# Patient Record
Sex: FEMALE | Race: WHITE | NOT HISPANIC OR LATINO | Employment: PART TIME | URBAN - METROPOLITAN AREA
[De-identification: names, ages, dates, MRNs, and addresses within clinical notes are randomized per-mention and may not be internally consistent; named-entity substitution may affect disease eponyms.]

---

## 2017-03-03 ENCOUNTER — HOSPITAL ENCOUNTER (EMERGENCY)
Facility: HOSPITAL | Age: 37
Discharge: HOME | End: 2017-03-03
Attending: EMERGENCY MEDICINE
Payer: MEDICAID

## 2017-03-03 VITALS
RESPIRATION RATE: 18 BRPM | OXYGEN SATURATION: 97 % | WEIGHT: 130 LBS | BODY MASS INDEX: 20.98 KG/M2 | SYSTOLIC BLOOD PRESSURE: 114 MMHG | DIASTOLIC BLOOD PRESSURE: 70 MMHG | TEMPERATURE: 98 F | HEART RATE: 87 BPM

## 2017-03-03 DIAGNOSIS — S99.921A RIGHT FOOT INJURY: ICD-10-CM

## 2017-03-03 DIAGNOSIS — S90.811A ABRASION, RIGHT FOOT, INITIAL ENCOUNTER: Primary | ICD-10-CM

## 2017-03-03 DIAGNOSIS — F41.9 ANXIETY DISORDER, UNSPECIFIED TYPE: ICD-10-CM

## 2017-03-03 DIAGNOSIS — S99.921A FOOT INJURY, RIGHT, INITIAL ENCOUNTER: ICD-10-CM

## 2017-03-03 PROCEDURE — 99283 EMERGENCY DEPT VISIT LOW MDM: CPT

## 2017-03-03 PROCEDURE — 25000003 PHARM REV CODE 250: Performed by: EMERGENCY MEDICINE

## 2017-03-03 RX ORDER — NAPROXEN 500 MG/1
500 TABLET ORAL 2 TIMES DAILY WITH MEALS
Qty: 10 TABLET | Refills: 0 | Status: SHIPPED | OUTPATIENT
Start: 2017-03-03 | End: 2017-04-05 | Stop reason: CLARIF

## 2017-03-03 RX ORDER — HYDROXYZINE PAMOATE 25 MG/1
25 CAPSULE ORAL EVERY 6 HOURS PRN
Qty: 20 CAPSULE | Refills: 0 | Status: SHIPPED | OUTPATIENT
Start: 2017-03-03 | End: 2018-09-12 | Stop reason: CLARIF

## 2017-03-03 RX ORDER — HYDROXYZINE PAMOATE 25 MG/1
25 CAPSULE ORAL
Status: COMPLETED | OUTPATIENT
Start: 2017-03-03 | End: 2017-03-03

## 2017-03-03 RX ORDER — NAPROXEN 500 MG/1
500 TABLET ORAL
Status: COMPLETED | OUTPATIENT
Start: 2017-03-03 | End: 2017-03-03

## 2017-03-03 RX ADMIN — NAPROXEN 500 MG: 500 TABLET ORAL at 06:03

## 2017-03-03 RX ADMIN — HYDROXYZINE PAMOATE 25 MG: 25 CAPSULE ORAL at 06:03

## 2017-03-03 NOTE — ED AVS SNAPSHOT
[facility]  [address] [facility] [id number]               Manuela Dia   3/3/2017  6:01 PM   ED    Description:  Female : 1980   Department:  [facility]            Your Care was Coordinated By:     Provider Role From To    Ted Law Jr., MD Attending Provider 17 0811 --      Reason for Visit     Foot Injury           Diagnoses this Visit        Comments    Abrasion, right foot, initial encounter    -  Primary     Foot injury, right, initial encounter         Right foot injury         Anxiety disorder, unspecified type           ED Disposition     None           To Do List           Follow-up Information     Follow up with [facility]. Call in 3 days.    Why:  As needed  to schedule appt for recheck of your right foot - follo wup with your psychaitrist regarding long term treatment of your anxiety/ mental health issues    Contact information:    [address] [facility] [id number] [phone]         These Medications        Disp Refills Start End    naproxen (NAPROSYN) 500 MG tablet 10 tablet 0 3/3/2017     Take 1 tablet (500 mg total) by mouth 2 (two) times daily with meals. Prn pain - take with food - Oral    hydrOXYzine pamoate (VISTARIL) 25 MG Cap 20 capsule 0 3/3/2017     Take 1 capsule (25 mg total) by mouth every 6 (six) hours as needed (prn anxiety). - Oral      [facility]     [facility] On [facility] Nurse Care Line -  Assistance  Registered nurses in the [facility] provide clinical advisement, health education, appointment booking, and other advisory services.  Call for this free service at [phone].             Medications           Message regarding Medications     Verify the changes and/or additions to your medication regime listed below are the same as discussed with your clinician today.  If any of these changes or additions are incorrect, please notify your healthcare  provider.        START taking these NEW medications        Refills    naproxen (NAPROSYN) 500 MG tablet 0    Sig: Take 1 tablet (500 mg total) by mouth 2 (two) times daily with meals. Prn pain - take with food    Class: Print    Route: Oral    hydrOXYzine pamoate (VISTARIL) 25 MG Cap 0    Sig: Take 1 capsule (25 mg total) by mouth every 6 (six) hours as needed (prn anxiety).    Class: Print    Route: Oral      These medications were administered today        Dose Freq    hydrOXYzine pamoate capsule 25 mg 25 mg ED 1 Time    Sig: Take 1 capsule (25 mg total) by mouth ED 1 Time.    Class: Normal    Route: Oral    naproxen tablet 500 mg 500 mg ED 1 Time    Sig: Take 1 tablet (500 mg total) by mouth ED 1 Time.    Class: Normal    Route: Oral           Verify that the below list of medications is an accurate representation of the medications you are currently taking.  If none reported, the list may be blank. If incorrect, please contact your healthcare provider. Carry this list with you in case of emergency.           Current Medications     albuterol 90 mcg/actuation inhaler Inhale 1-2 puffs into the lungs every 6 (six) hours as needed for Wheezing.    clonazePAM (KLONOPIN) 2 MG Tab Take 2 mg by mouth 2 (two) times daily.    hydrOXYzine pamoate (VISTARIL) 25 MG Cap Take 1 capsule (25 mg total) by mouth every 6 (six) hours as needed (prn anxiety).    naproxen (NAPROSYN) 500 MG tablet Take 1 tablet (500 mg total) by mouth 2 (two) times daily with meals. Prn pain - take with food    ondansetron (ZOFRAN-ODT) 4 MG TbDL Take 1 tablet (4 mg total) by mouth every 8 (eight) hours as needed.           Clinical Reference Information           Your Vitals Were     BP Pulse Temp Resp Weight Last Period    114/70 (BP Location: Right arm, Patient Position: Sitting) 87 97.9 °F (36.6 °C) (Oral) 18 59 kg (130 lb) 07/08/2014    SpO2 BMI             97% 20.98 kg/m2         Allergies as of 3/3/2017     No Known Allergies      Immunizations  Administered on Date of Encounter - 3/3/2017     None      ED Micro, Lab, POCT     None      ED Imaging Orders     Start Ordered       Status Ordering Provider    03/03/17 1814 03/03/17 1813  X-Ray Foot Complete Right  1 time imaging      Final result     03/03/17 1812 03/03/17 1811    1 time imaging,   Status:  Canceled      Canceled         Discharge Instructions         Abrasions  Abrasions are skin scrapes. Their treatment depends on how large and deep the abrasion is.  Home care  You may be prescribed an antibiotic cream or ointment to apply to the wound. This helps prevent infection. Follow instructions when using this medication.  General care  · To care for the abrasion, do the following each day for as long as directed by your health care provider.  ¨ If you were given a bandage, change it once a day. If your bandage sticks to the wound, soak it in warm water until it loosens.  ¨ Wash the area with soap and warm water. You may do this in a sink or under a tub faucet or shower. Rinse off the soap. Then pat the area dry with a clean towel.  ¨ If antibiotic ointment or cream was prescribed, reapply it to the wound as directed. Cover the wound with a fresh non-stick bandage. If the bandage becomes wet or dirty, change it as soon as possible.  · You may use acetaminophen or ibuprofen to control pain unless another pain medication was prescribed. Note: If you have chronic liver or kidney disease or ever had a stomach ulcer or GI bleeding, talk with your health care provider before using these medications. Do not use ibuprofen in children under six months of age.  · Most skin wounds heal within ten days. But an infection may occur despite treatment. Therefore, monitor the wound for signs of infection as listed below.  Follow-up care  Follow up with your health care provider, or as advised.  When to seek medical advice  Call your health care provider right away if any of these occur:  · Fever of 101ºF (38.3ºC) or  higher, or as directed by your health care provider  · Increasing pain, redness, swelling, or drainage from the wound  · Bleeding from the wound that does not stop after a few minutes of steady, firm pressure  · Decreased ability to move any body part near wound  Date Last Reviewed: 3/22/2015  © 9226-6794 [employer]. [address] [place] [address], [place] [address]. All rights reserved. This information is not intended as a substitute for professional medical care. Always follow your healthcare professional's instructions.          ACE Wrap  Minor muscle or joint injuries are often treated with an elastic bandage. The bandage provides support and compression to the injured area. An elastic bandage is a stretchy, rolled bandage. Elastic bandages range in width from 2 to 6 inches. They can be used for a variety of injuries. The bandages are often called ACE bandages, after the most common brand name.  If used correctly, elastic bandages help control swelling and ease pain. An elastic bandage is also a good reminder not to overuse the injured area. However, elastic bandages do not provide a lot of support and will not prevent reinjury.  Home care    To apply an elastic bandage:  · Check the skin before wrapping the injury. It should be clean, dry, and free of drainage.  · Start wrapping below the injury and work your way toward the body. For an ankle sprain, start wrapping around the foot and work up toward the calf. This will help control swelling.  · Overlap the edges of the bandage so it stays snuggly in place.  · Wrap the bandage firmly, but not too tightly. A tight bandage can increase swelling on either end of the bandage. Make sure the bandage is wrinkle free.  · Leave fingers and toes exposed.  · Secure ends of the bandage (even self-sticking ones) with clips or tape.  · Check frequently to ensure adequate circulation, especially in the fingers and toes. Loosen the bandage if there is local swelling,  numbness, tingling, discomfort, coldness, or discoloration (skin pale or bluish in color).  · Rewrap the bandage as needed during the day. Reroll the bandage as you unwind it.  Continue using the elastic bandage until the pain and swelling are gone or as your healthcare provider advises.  If you have been told to ice the area, the ice can be secured in place with the elastic bandage. Wrap the ice pack with a thin towel to protect the skin. Do not put ice or an ice pack directly on the skin.  Ice the area for no more than 20 minutes at a time.    Follow-up care  Follow up with your healthcare provider, as advised.  When to seek medical advice  Call your healthcare provider for any of the following:  · Pain and swelling that doesn't get better or gets worse  · Trouble moving injured area  · Skin discoloration, numbness, or tingling that doesnt go away after bandage is removed  Date Last Reviewed: 9/13/2015 © 2000-2016 [employer]. [address] [place] [address]. All rights reserved. This information is not intended as a substitute for professional medical care. Always follow your healthcare professional's instructions.          Treating Anxiety Disorders with Medication  An anxiety disorder can make you feel nervous or apprehensive, even without a clear reason. Certain anxiety disorders can cause intense feelings of fear or panic. You may even have physical symptoms, such as a racing heartbeat or dizziness. If you have these feelings, you dont have to suffer anymore. Treatment to help you overcome your fears will likely include therapy (also called counseling). Medication may also be prescribed to help control your symptoms.    Medications  Certain medications may be prescribed to help control your symptoms. As a result, you may feel less anxious. You may also feel able to move forward with therapy. At first, medications and dosages may need to be adjusted to find what works best for you.  Try to be patient. Tell your health care provider how a medication makes you feel. This way, you can work together to find the treatment thats best for you. Keep in mind that medications can have side effects. Talk to your provider about any side effects that are bothering you. Changing the dose or type of medication may help. Dont stop taking medication on your own because it can cause symptoms to come back.  · Anti-anxiety medication: This medication eases symptoms and helps you relax. Your health care provider will explain when and how to use it. It may be prescribed for use before situations that makes you anxious. Or, you may be told to take it on a regular schedule. Anti-anxiety medication may make you feel a little sleepy or out of it. Dont drive a car or operate machinery while on this medication, until you know how it affects you.  Caution  Never use alcohol or other drugs with anti-anxiety medications. This could result in loss of muscular control, sedation, coma or death. Also, use only the amount of medication prescribed for you. If you think you may have taken too much, get emergency care right away.   · Antidepressant medication: This kind of medication is often used to treat anxiety, even if you arent depressed. An antidepressant helps balance out brain chemicals. This helps keep anxiety under control. This medication is taken on a schedule. It takes a few weeks to start working. If you dont notice a change at first, you may just need more time. But if you dont notice results after the first few weeks, tell your provider.  Keep taking medications as prescribed  Never change your dosage or stop taking your medications without talking to your health care provider first. Keep the following in mind:  · Some medications must be taken on a schedule. Make this part of your daily routine. For instance, always take your pill before brushing your teeth. A pillbox can help you remember if youve taken  your medication each day.  · Medications are often taken for 6 to 12 months. Your health care provider will then evaluate whether you need to stay on them. Many people who have also had therapy may no longer need medication to manage anxiety.  · You may need to stop taking medication slowly to give your body time to adjust. When its time to stop, your health care provider will tell you more. Remember: Never stop taking your medication without talking to your provider first.  · If symptoms return, you may need to start taking medications again. This isnt your fault. Its just the nature of your anxiety disorder.  Special concerns  · Side effects: Medications may cause side effects. Ask your health care provider or pharmacist what you can expect. They may have ideas for avoiding some side effects.  · Sexual problems: Some antidepressants can affect your desire for sex or your ability to have an orgasm. A change in dosage or medication often solves the problem. If you have a sexual side effect that concerns you, tell your health care provider.  · Addiction: Antidepressants are not addictive. And if youve never had a problem with drugs or alcohol, you likely wont have a problem with anti-anxiety medication. But if you have history of addiction, you may need to avoid this medication.   Date Last Reviewed: 3/27/2015  © 4932-1216 [employer]. [address] [place] [address], [place] [address]. All rights reserved. This information is not intended as a substitute for professional medical care. Always follow your healthcare professional's instructions.          [facility] Sign-Up     Activating your [facility] account is as easy as 1-2-3!     1) Visit [email or web address], select Sign Up Now, enter this activation code and your date of birth, then select Next.  [email or web address]  Expires: 4/17/2017  6:53 PM      2) Create a username and password to use when you visit [facility] in the future and select a security question in  case you lose your password and select Next.    3) Enter your e-mail address and click Sign Up!    Additional Information  If you have questions, please e-mail [email or web address] or call [phone] to talk to our [facility] staff. Remember, [facility] is NOT to be used for urgent needs. For medical emergencies, dial 911.          [facility] complies with applicable Federal civil rights laws and does not discriminate on the basis of race, color, national origin, age, disability, or sex.        Language Assistance Services     ATTENTION: Language assistance services are available, free of charge. Please call [phone].      ATENCIÓN: Si habla español, tiene a shanks disposición servicios gratuitos de asistencia lingüística. Llame al [phone].     CHÚ Ý: N?u b?n nói Ti?ng Vi?t, có các d?ch v? h? tr? ngôn ng? mi?n phí dành cho b?n. G?i s? [phone].

## 2017-03-04 NOTE — ED NOTES
Pt was sitting in TL as a family member with her child who was checked into the ED. Pt was sitting in one of the recliner chairs. Pt reports she was reclined in chair and went to get up out of chair and her right foot got stuck in recliner portion of the chair and she fell forward. Pt reports her right foot remained stuck in chair. Pt states reports she was unable to get foot out of chair due to holding her child in her arms. Pt reports she then handed her child to another family member and was able to get foot out of chair. CRISTO Roberson notified and made aware. Pt c/o right foot pain. Pt has abrasion and bruising to dorsal aspect of foot. Pt denies hitting head or any other body part or LOC.

## 2017-03-04 NOTE — DISCHARGE INSTRUCTIONS
Abrasions  Abrasions are skin scrapes. Their treatment depends on how large and deep the abrasion is.  Home care  You may be prescribed an antibiotic cream or ointment to apply to the wound. This helps prevent infection. Follow instructions when using this medication.  General care  · To care for the abrasion, do the following each day for as long as directed by your health care provider.  ¨ If you were given a bandage, change it once a day. If your bandage sticks to the wound, soak it in warm water until it loosens.  ¨ Wash the area with soap and warm water. You may do this in a sink or under a tub faucet or shower. Rinse off the soap. Then pat the area dry with a clean towel.  ¨ If antibiotic ointment or cream was prescribed, reapply it to the wound as directed. Cover the wound with a fresh non-stick bandage. If the bandage becomes wet or dirty, change it as soon as possible.  · You may use acetaminophen or ibuprofen to control pain unless another pain medication was prescribed. Note: If you have chronic liver or kidney disease or ever had a stomach ulcer or GI bleeding, talk with your health care provider before using these medications. Do not use ibuprofen in children under six months of age.  · Most skin wounds heal within ten days. But an infection may occur despite treatment. Therefore, monitor the wound for signs of infection as listed below.  Follow-up care  Follow up with your health care provider, or as advised.  When to seek medical advice  Call your health care provider right away if any of these occur:  · Fever of 101ºF (38.3ºC) or higher, or as directed by your health care provider  · Increasing pain, redness, swelling, or drainage from the wound  · Bleeding from the wound that does not stop after a few minutes of steady, firm pressure  · Decreased ability to move any body part near wound  Date Last Reviewed: 3/22/2015  © 0198-6656 [employer] [address] [place] [address], [place]  [address]. All rights reserved. This information is not intended as a substitute for professional medical care. Always follow your healthcare professional's instructions.          ACE Wrap  Minor muscle or joint injuries are often treated with an elastic bandage. The bandage provides support and compression to the injured area. An elastic bandage is a stretchy, rolled bandage. Elastic bandages range in width from 2 to 6 inches. They can be used for a variety of injuries. The bandages are often called ACE bandages, after the most common brand name.  If used correctly, elastic bandages help control swelling and ease pain. An elastic bandage is also a good reminder not to overuse the injured area. However, elastic bandages do not provide a lot of support and will not prevent reinjury.  Home care    To apply an elastic bandage:  · Check the skin before wrapping the injury. It should be clean, dry, and free of drainage.  · Start wrapping below the injury and work your way toward the body. For an ankle sprain, start wrapping around the foot and work up toward the calf. This will help control swelling.  · Overlap the edges of the bandage so it stays snuggly in place.  · Wrap the bandage firmly, but not too tightly. A tight bandage can increase swelling on either end of the bandage. Make sure the bandage is wrinkle free.  · Leave fingers and toes exposed.  · Secure ends of the bandage (even self-sticking ones) with clips or tape.  · Check frequently to ensure adequate circulation, especially in the fingers and toes. Loosen the bandage if there is local swelling, numbness, tingling, discomfort, coldness, or discoloration (skin pale or bluish in color).  · Rewrap the bandage as needed during the day. Reroll the bandage as you unwind it.  Continue using the elastic bandage until the pain and swelling are gone or as your healthcare provider advises.  If you have been told to ice the area, the ice can be secured in place with the  elastic bandage. Wrap the ice pack with a thin towel to protect the skin. Do not put ice or an ice pack directly on the skin.  Ice the area for no more than 20 minutes at a time.    Follow-up care  Follow up with your healthcare provider, as advised.  When to seek medical advice  Call your healthcare provider for any of the following:  · Pain and swelling that doesn't get better or gets worse  · Trouble moving injured area  · Skin discoloration, numbness, or tingling that doesnt go away after bandage is removed  Date Last Reviewed: 9/13/2015  © 9699-1447 [employer]. [address] [place] [address], [place] [address]. All rights reserved. This information is not intended as a substitute for professional medical care. Always follow your healthcare professional's instructions.          Treating Anxiety Disorders with Medication  An anxiety disorder can make you feel nervous or apprehensive, even without a clear reason. Certain anxiety disorders can cause intense feelings of fear or panic. You may even have physical symptoms, such as a racing heartbeat or dizziness. If you have these feelings, you dont have to suffer anymore. Treatment to help you overcome your fears will likely include therapy (also called counseling). Medication may also be prescribed to help control your symptoms.    Medications  Certain medications may be prescribed to help control your symptoms. As a result, you may feel less anxious. You may also feel able to move forward with therapy. At first, medications and dosages may need to be adjusted to find what works best for you. Try to be patient. Tell your health care provider how a medication makes you feel. This way, you can work together to find the treatment thats best for you. Keep in mind that medications can have side effects. Talk to your provider about any side effects that are bothering you. Changing the dose or type of medication may help. Dont stop taking medication on your own  because it can cause symptoms to come back.  · Anti-anxiety medication: This medication eases symptoms and helps you relax. Your health care provider will explain when and how to use it. It may be prescribed for use before situations that makes you anxious. Or, you may be told to take it on a regular schedule. Anti-anxiety medication may make you feel a little sleepy or out of it. Dont drive a car or operate machinery while on this medication, until you know how it affects you.  Caution  Never use alcohol or other drugs with anti-anxiety medications. This could result in loss of muscular control, sedation, coma or death. Also, use only the amount of medication prescribed for you. If you think you may have taken too much, get emergency care right away.   · Antidepressant medication: This kind of medication is often used to treat anxiety, even if you arent depressed. An antidepressant helps balance out brain chemicals. This helps keep anxiety under control. This medication is taken on a schedule. It takes a few weeks to start working. If you dont notice a change at first, you may just need more time. But if you dont notice results after the first few weeks, tell your provider.  Keep taking medications as prescribed  Never change your dosage or stop taking your medications without talking to your health care provider first. Keep the following in mind:  · Some medications must be taken on a schedule. Make this part of your daily routine. For instance, always take your pill before brushing your teeth. A pillbox can help you remember if youve taken your medication each day.  · Medications are often taken for 6 to 12 months. Your health care provider will then evaluate whether you need to stay on them. Many people who have also had therapy may no longer need medication to manage anxiety.  · You may need to stop taking medication slowly to give your body time to adjust. When its time to stop, your health care  provider will tell you more. Remember: Never stop taking your medication without talking to your provider first.  · If symptoms return, you may need to start taking medications again. This isnt your fault. Its just the nature of your anxiety disorder.  Special concerns  · Side effects: Medications may cause side effects. Ask your health care provider or pharmacist what you can expect. They may have ideas for avoiding some side effects.  · Sexual problems: Some antidepressants can affect your desire for sex or your ability to have an orgasm. A change in dosage or medication often solves the problem. If you have a sexual side effect that concerns you, tell your health care provider.  · Addiction: Antidepressants are not addictive. And if youve never had a problem with drugs or alcohol, you likely wont have a problem with anti-anxiety medication. But if you have history of addiction, you may need to avoid this medication.   Date Last Reviewed: 3/27/2015  © 4237-3159 [employer]. [address] [place] [address], [place] [address]. All rights reserved. This information is not intended as a substitute for professional medical care. Always follow your healthcare professional's instructions.

## 2017-03-04 NOTE — ED PROVIDER NOTES
SCRIBE #1 NOTE: I, Katie Mcgill, am scribing for, and in the presence of, Ted Law Jr., MD. I have scribed the entire note.      History      Chief Complaint   Patient presents with    Foot Injury     pt got right foot stuck in TL chair        Review of patient's allergies indicates:  No Known Allergies     HPI   HPI    3/3/2017, 6:11 PM   History obtained from the patient      History of Present Illness: Manuela Dia is a 36 y.o. female patient who presents to the Emergency Department for a foot injury which onset suddenly. Pt is here with another pt and was waiting in the treatment lounge when her R foot got stuck in the chair. Pain is located to R foot. No mitigating or exacerbating factors reported. Pt also c/o of anxiety and nervousness and is asking for medication. Patient denies any fever, chills, N/V/D, numbness/weakness, SOB, CP, and all other sxs at this time. No prior Tx. No further complaints or concerns at this time.       Arrival mode: Personal vehicle    PCP: Primary Doctor No       Past Medical History:  Past Medical History:   Diagnosis Date    Anxiety     Chronic back pain     Spine degeneration        Past Surgical History:  Past Surgical History:   Procedure Laterality Date    HYSTERECTOMY      RADIAL OPTIC NEUROTOMY           Family History:  Family History   Problem Relation Age of Onset    Scleroderma Mother     Pulmonary fibrosis Mother     Diabetes Mother     Hypertension Mother     Hyperlipidemia Mother     Cancer Mother     COPD Mother        Social History:  Social History     Social History Main Topics    Smoking status: Current Every Day Smoker     Packs/day: 0.50     Types: Cigarettes    Smokeless tobacco: Never Used    Alcohol use No    Drug use: Yes     Special: Marijuana      Comment: quit 1 week ago    Sexual activity: Yes     Partners: Male     Birth control/ protection: See Surgical Hx       ROS   Review of Systems   Constitutional: Negative for chills,  fatigue and fever.   HENT: Negative for sore throat.    Respiratory: Negative for shortness of breath.    Cardiovascular: Negative for chest pain.   Gastrointestinal: Negative for diarrhea, nausea and vomiting.   Genitourinary: Negative for dysuria.   Musculoskeletal: Negative for back pain.   Skin: Negative for rash.        (+) abrasion to R foot   Neurological: Negative for weakness.   Hematological: Does not bruise/bleed easily.   All other systems reviewed and are negative.    Physical Exam    Initial Vitals   BP Pulse Resp Temp SpO2   03/03/17 1802 03/03/17 1802 03/03/17 1802 03/03/17 1802 03/03/17 1802   114/70 87 18 97.9 °F (36.6 °C) 97 %      Physical Exam  Nursing Notes and Vital Signs Reviewed.  Constitutional: Patient is in no acute distress. Awake and alert. Well-developed and well-nourished.  Head: Atraumatic. Normocephalic.  Eyes: PERRL. EOM intact. Conjunctivae are not pale. No scleral icterus.  ENT: Mucous membranes are moist. Oropharynx is clear and symmetric.    Neck: Supple. Full ROM. No lymphadenopathy.  Cardiovascular: Regular rate. Regular rhythm. No murmurs, rubs, or gallops. Distal pulses are 2+ and symmetric.  Pulmonary/Chest: No respiratory distress. Clear to auscultation bilaterally. No wheezing, rales, or rhonchi.  Abdominal: Soft and non-distended.  There is no tenderness.  No rebound, guarding, or rigidity. Good bowel sounds.  Genitourinary: No CVA tenderness  Musculoskeletal: Moves all extremities. No obvious deformities. No edema. No calf tenderness.  Skin: Warm and dry. Abrasion to dorsum of R foot.  Neurological:  Alert, awake, and appropriate.  Normal speech.  No acute focal neurological deficits are appreciated.  Psychiatric: Normal affect. Good eye contact. Appropriate in content.    ED Course    Procedures  ED Vital Signs:  Vitals:    03/03/17 1802   BP: 114/70   Pulse: 87   Resp: 18   Temp: 97.9 °F (36.6 °C)   TempSrc: Oral   SpO2: 97%   Weight: 59 kg (130 lb)         Imaging  Results:  Imaging Results         X-Ray Foot Complete Right (Final result) Result time:  03/03/17 18:35:19    Final result by Karl Michelle MD (03/03/17 18:35:19)    Impression:           1.  Negative for acute process involving the visualized osseous structures or soft tissues.  2. Incidental findings as noted above.      Electronically signed by: KARL MICHELLE MD  Date:     03/03/17  Time:    18:35     Narrative:    Right foot x-ray, 3 views :    Clinical Indication: S99.921A Unspecified injury of right foot, initial encounter. Pain and right foot    Findings: No comparison studies are available. 3 views of the right foot or submitted for interpretation.  Joint spaces are well-maintained.     Alignment is satisfactory. No   fractures, dislocations, or erosive arthritic change.  Negative for radiopaque foreign bodies or air in the soft tissues. Slight irregularity of the base of the 2nd proximal phalanx appears to be related to old trauma, or developmental anomaly.                      The Emergency Provider reviewed the vital signs and test results, which are outlined above.    ED Discussion     7:18 PM: Reassessed pt at this time. Discussed with pt all pertinent ED information and results. Discussed pt dx and plan of tx. Gave pt all f/u and return to the ED instructions. All questions and concerns were addressed at this time. Pt expresses understanding of information and instructions, and is comfortable with plan to discharge. Pt is stable for discharge.    I discussed wound care precautions with patient and/or family/caretaker; specifically that all wounds have risk of infection despite efforts to cleanse and debride the wound; and there is a risk of an occult foreign body (and thus increased risk of infection) despite a negative examination.  I discussed with patient need to return for any signs of infection, specifically redness, increased pain, fever, drainage of pus, or any concern, immediately.      ED  Medication(s):  Medications   hydrOXYzine pamoate capsule 25 mg (25 mg Oral Given 3/3/17 1825)   naproxen tablet 500 mg (500 mg Oral Given 3/3/17 1825)       Discharge Medication List as of 3/3/2017  6:53 PM      START taking these medications    Details   hydrOXYzine pamoate (VISTARIL) 25 MG Cap Take 1 capsule (25 mg total) by mouth every 6 (six) hours as needed (prn anxiety)., Starting 3/3/2017, Until Discontinued, Print      naproxen (NAPROSYN) 500 MG tablet Take 1 tablet (500 mg total) by mouth 2 (two) times daily with meals. Prn pain - take with food, Starting 3/3/2017, Until Discontinued, Print             Follow-up Information     Follow up with [facility]. Call in 3 days.    Why:  As needed  to schedule appt for recheck of your right foot - follo wup with your psychaitrist regarding long term treatment of your anxiety/ mental health issues    Contact information:    [facility] [address] [phone]              Medical Decision Making    Medical Decision Making:   Clinical Tests:   Radiological Study: Ordered and Reviewed           Scribe Attestation:   Scribe #1: I performed the above scribed service and the documentation accurately describes the services I performed. I attest to the accuracy of the note.    Attending:   Physician Attestation Statement for Scribe #1: I, Ted Law Jr., MD, personally performed the services described in this documentation, as scribed by Katie Mcgill, in my presence, and it is both accurate and complete.          Clinical Impression       ICD-10-CM ICD-9-CM   1. Abrasion, right foot, initial encounter S90.811A 917.0   2. Foot injury, right, initial encounter S99.921A 959.7   3. Right foot injury S99.921A 959.7   4. Anxiety disorder, unspecified type F41.9 300.00       Disposition:   Disposition: Discharged  Condition: Stable    Dictation #1  MRN:[id number]  CSN:[id number]       Ted Law Jr., MD  03/04/17 2976

## 2017-04-05 ENCOUNTER — HOSPITAL ENCOUNTER (EMERGENCY)
Facility: HOSPITAL | Age: 37
Discharge: HOME | End: 2017-04-05
Payer: MEDICAID

## 2017-04-05 VITALS
DIASTOLIC BLOOD PRESSURE: 74 MMHG | HEIGHT: 66 IN | HEART RATE: 81 BPM | RESPIRATION RATE: 18 BRPM | BODY MASS INDEX: 17.68 KG/M2 | WEIGHT: 110 LBS | TEMPERATURE: 98 F | OXYGEN SATURATION: 99 % | SYSTOLIC BLOOD PRESSURE: 102 MMHG

## 2017-04-05 DIAGNOSIS — S30.0XXA COCCYX CONTUSION, INITIAL ENCOUNTER: ICD-10-CM
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
## 2017-04-05 DIAGNOSIS — S30.0XXA CONTUSION OF LOWER BACK, INITIAL ENCOUNTER: ICD-10-CM

## 2017-04-05 PROCEDURE — 25000003 PHARM REV CODE 250: Performed by: PHYSICIAN ASSISTANT

## 2017-04-05 PROCEDURE — 99283 EMERGENCY DEPT VISIT LOW MDM: CPT

## 2017-04-05 RX ORDER — METHOCARBAMOL 750 MG/1
750 TABLET, FILM COATED ORAL 2 TIMES DAILY PRN
Qty: 15 TABLET | Refills: 0 | Status: SHIPPED | OUTPATIENT
Start: 2017-04-05 | End: 2018-09-12 | Stop reason: CLARIF

## 2017-04-05 RX ORDER — METHOCARBAMOL 500 MG/1
500 TABLET, FILM COATED ORAL
Status: COMPLETED | OUTPATIENT
Start: 2017-04-05 | End: 2017-04-05

## 2017-04-05 RX ORDER — NABUMETONE 500 MG/1
500 TABLET, FILM COATED ORAL 2 TIMES DAILY PRN
Qty: 12 TABLET | Refills: 0 | Status: SHIPPED | OUTPATIENT
Start: 2017-04-05 | End: 2017-10-29 | Stop reason: ALTCHOICE

## 2017-04-05 RX ORDER — ALPRAZOLAM 2 MG/1
TABLET ORAL NIGHTLY PRN
COMMUNITY
End: 2018-09-12 | Stop reason: CLARIF

## 2017-04-05 RX ORDER — KETOROLAC TROMETHAMINE 10 MG/1
10 TABLET, FILM COATED ORAL
Status: COMPLETED | OUTPATIENT
Start: 2017-04-05 | End: 2017-04-05

## 2017-04-05 RX ORDER — CITALOPRAM 40 MG/1
40 TABLET, FILM COATED ORAL DAILY
COMMUNITY
End: 2018-09-12 | Stop reason: CLARIF

## 2017-04-05 RX ADMIN — METHOCARBAMOL 500 MG: 500 TABLET ORAL at 09:04

## 2017-04-05 RX ADMIN — KETOROLAC TROMETHAMINE 10 MG: 10 TABLET, FILM COATED ORAL at 09:04

## 2017-04-05 NOTE — ED AVS SNAPSHOT
[facility] -   [id number] [place] [id number]               Manuela Dia   2017  7:56 PM   ED    Description:  Female : 1980   Department:  [facility] -            Your Care was Coordinated By:     Provider Role From To    Aashish Maurice PA-C Physician Assistant 17 --      Reason for Visit     Fall           Diagnoses this Visit        Comments    Fall    -  Primary     Contusion of lower back, initial encounter         Coccyx contusion, initial encounter           ED Disposition     ED Disposition Condition Comment    Discharge             To Do List           Follow-up Information     Follow up with [facility].    Specialty:  Emergency Medicine    Why:  If symptoms worsen in any way. Follow up with your primary care physician in the next 1-2 days for re-evaluation and further management.     Contact information:    [address] [place] [id number] [phone]       These Medications        Disp Refills Start End    nabumetone (RELAFEN) 500 MG tablet 12 tablet 0 2017     Take 1 tablet (500 mg total) by mouth 2 (two) times daily as needed for Pain. - Oral    methocarbamol (ROBAXIN) 750 MG Tab 15 tablet 0 2017     Take 1 tablet (750 mg total) by mouth 2 (two) times daily as needed (Muscle relaxer). - Oral      [facility]     [facility] - 24/ Assistance  Unless otherwise directed by your provider, please contact [facility], our nurse care line that is available for  assistance.     Registered nurses in the [facility] provide: appointment scheduling, clinical advisement, health education, and other advisory services.  Call: [phone] (toll free)               Medications           Message regarding Medications     Verify the changes and/or additions to your medication regime listed below are the same as discussed  with your clinician today.  If any of these changes or additions are incorrect, please notify your healthcare provider.        START taking these NEW medications        Refills    nabumetone (RELAFEN) 500 MG tablet 0    Sig: Take 1 tablet (500 mg total) by mouth 2 (two) times daily as needed for Pain.    Class: Print    Route: Oral    methocarbamol (ROBAXIN) 750 MG Tab 0    Sig: Take 1 tablet (750 mg total) by mouth 2 (two) times daily as needed (Muscle relaxer).    Class: Print    Route: Oral      These medications were administered today        Dose Freq    ketorolac tablet 10 mg 10 mg ED 1 Time    Sig: Take 1 tablet (10 mg total) by mouth ED 1 Time.    Class: Normal    Route: Oral    Cosign for Ordering: Required by Thien Tracy MD    methocarbamol tablet 500 mg 500 mg ED 1 Time    Sig: Take 1 tablet (500 mg total) by mouth ED 1 Time.    Class: Normal    Route: Oral    Cosign for Ordering: Required by Thien Tracy MD      STOP taking these medications     clonazePAM (KLONOPIN) 2 MG Tab Take 2 mg by mouth 2 (two) times daily.    ondansetron (ZOFRAN-ODT) 4 MG TbDL Take 1 tablet (4 mg total) by mouth every 8 (eight) hours as needed.    naproxen (NAPROSYN) 500 MG tablet Take 1 tablet (500 mg total) by mouth 2 (two) times daily with meals. Prn pain - take with food           Verify that the below list of medications is an accurate representation of the medications you are currently taking.  If none reported, the list may be blank. If incorrect, please contact your healthcare provider. Carry this list with you in case of emergency.           Current Medications     alprazolam (XANAX) 2 MG Tab Take by mouth nightly as needed.    citalopram (CELEXA) 40 MG tablet Take 40 mg by mouth once daily.    albuterol 90 mcg/actuation inhaler Inhale 1-2 puffs into the lungs every 6 (six) hours as needed for Wheezing.    hydrOXYzine pamoate (VISTARIL) 25 MG Cap Take 1 capsule (25 mg total) by mouth every 6 (six) hours as needed  "(prn anxiety).    ketorolac tablet 10 mg Take 1 tablet (10 mg total) by mouth ED 1 Time.    methocarbamol (ROBAXIN) 750 MG Tab Take 1 tablet (750 mg total) by mouth 2 (two) times daily as needed (Muscle relaxer).    methocarbamol tablet 500 mg Take 1 tablet (500 mg total) by mouth ED 1 Time.    nabumetone (RELAFEN) 500 MG tablet Take 1 tablet (500 mg total) by mouth 2 (two) times daily as needed for Pain.           Clinical Reference Information           Your Vitals Were     BP Pulse Temp Resp Height Weight    102/74 (BP Location: Right arm, Patient Position: Sitting) 81 97.9 °F (36.6 °C) (Oral) 18 5' 6" (1.676 m) 49.9 kg (110 lb)    Last Period SpO2 BMI          07/08/2014 99% 17.75 kg/m2        Allergies as of 4/5/2017     No Known Allergies      Immunizations Administered on Date of Encounter - 4/5/2017     None      ED Micro, Lab, POCT     None      ED Imaging Orders     Start Ordered       Status Ordering Provider    04/05/17 2010 04/05/17 2009  X-Ray Sacrum And Coccyx  1 time imaging      Final result     04/05/17 2010 04/05/17 2009  X-Ray Lumbar Spine Ap And Lateral  1 time imaging      Final result         Discharge Instructions         Back Contusion    You have a contusion to your back. A contusion is also called a bruise. There is swelling and some bleeding under the skin. The skin may be purplish. You may have muscle aching and stiffness in the area of the bruise. There are no broken bones.  Contusions heal on their own, without further treatment. However, pain and skin discoloration may take weeks to months to go away.   Home care  · Rest. Avoid heavy lifting, strenuous exertion, or any activity that causes pain.  · Ice the area to reduce pain and swelling. Put ice cubes in a plastic bag or use a cold pack. (Wrap the cold source in a thin towel. Do not place it directly on your skin.) Ice the injured area for 20 minutes every 1-2 hours the first day. Continue with ice packs 3-4 times a day for the next " two days, then as needed for the relief of pain and swelling.  · Take any prescribed pain medication. If none was prescribed, take acetaminophen, ibuprofen, or naproxen to control pain.  Follow-up care  Follow up with your healthcare provider, or as directed. Call if you are not better in 1-2 weeks.  When to seek medical advice  Call your healthcare provider for any of the following:  · New or worsening pain  · Increased swelling around the bruise  · Pain spreads to one or both legs  · Weakness or numbness in one or both legs   · Loss of bowel or bladder control  · Numbness in the groin or genital area  · Fever of 100.4°F (38ºC) or higher, or as directed by your healthcare provider  Date Last Reviewed: 6/26/2015 © 2000-2016 [employer]. [address] [place] [address], [place] [address]. All rights reserved. This information is not intended as a substitute for professional medical care. Always follow your healthcare professional's instructions.          Coccyx or Sacrum Contusion    You have a contusion (bruise) of the coccyx or sacrum. The sacrum is the triangular bone at the base of the spine that joins the pelvic bones. The coccyx (tailbone) is the last bone of the sacrum that hangs down in a point like a small tail. Symptoms include swelling and some bleeding under the skin. This injury generaly takes a few weeks to heal. During that time, the bruise will typically change in color from reddish, to purple-blue, to greenish-yellow, then to yellow-brown. A crack (fracture) in the coccyx bone causes the same symptoms as a contusion in this area. Often, x-rays are not taken since the treatment is the same. If you have fracture of the tailbone as well as a contusion, healing generally takes about four weeks or longer.  Home care  · Try to find a position of comfort. Try lying on your side with your knees bent up towards your chest and a pillow between your knees.  · A bruised tailbone causes pain when sitting.  You may try using a donut pillow. This is a foam pillow with a hole in the center to prevent pressure on the tailbone. You can buy this at a pharmacy or orthopedic supply store.  · Ice the injured area to help reduce pain and swelling. Wrap a cold source (ice pack or ice cubes in a plastic bag) in a thin towel. Apply to the bruised area for 20 minutes every 1 to 2 hours the first day. Continue this 3 to 4 times a day until the pain and swelling goes away.  · Unless another medication was prescribed, you can take acetaminophen, ibuprofen, or naproxen to control pain. (If you have chronic liver or kidney disease or ever had a stomach ulcer or GI bleeding, talk with your doctor before using these medicines.)  Follow up  Follow up with your health care provider or our staff as advised. Call if you are not improving within 2 weeks.  When to seek medical advice   Call your health care provider right away if you have any of the following:  · Increased pain or swelling  · Pain that becomes worse or spreads to one or both legs  · Weakness or numbness in one or both legs  · Loss of bowel or bladder control  · Numbness in the groin area  · Signs of infection, including warmth, drainage, or increased redness  · Frequent bruising for unknown reasons  Date Last Reviewed: 4/29/2015  © 7302-6017 [employer]. [address] [place] [address]. All rights reserved. This information is not intended as a substitute for professional medical care. Always follow your healthcare professional's instructions.          [facility] Sign-Up     Activating your [facility] account is as easy as 1-2-3!     1) Visit [email or web address], select Sign Up Now, enter this activation code and your date of birth, then select Next.  [email or web address]  Expires: 4/17/2017  7:53 PM      2) Create a username and password to use when you visit [facility] in the future and select a security question in case you lose your password and select  Next.    3) Enter your e-mail address and click Sign Up!    Additional Information  If you have questions, please e-mail [email or web address] or call [phone] to talk to our [facility] staff. Remember, [facility] is NOT to be used for urgent needs. For medical emergencies, dial 911.         Smoking Cessation     If you would like to quit smoking:   You may be eligible for free services if you are a [place] resident and started smoking cigarettes before September 1, 1988.  Call the Smoking Cessation Trust (SCT) toll free at (716) 606-8242 or (924) 506-2160.   Call -800-QUIT-NOW if you do not meet the above criteria.   Contact us via email: [email or web address]   View our website for more information: [email or web address]         [facility] complies with applicable Federal civil rights laws and does not discriminate on the basis of race, color, national origin, age, disability, or sex.        Language Assistance Services     ATTENTION: Language assistance services are available, free of charge. Please call [phone].      ATENCIÓN: Si habla español, tiene a shanks disposición servicios gratuitos de asistencia lingüística. Llame al [phone].     CHÚ Ý: N?u b?n nói Ti?ng Vi?t, có các d?ch v? h? tr? ngôn ng? mi?n phí dành cho b?n. G?i s? [phone].

## 2017-04-06 NOTE — DISCHARGE INSTRUCTIONS
Back Contusion    You have a contusion to your back. A contusion is also called a bruise. There is swelling and some bleeding under the skin. The skin may be purplish. You may have muscle aching and stiffness in the area of the bruise. There are no broken bones.  Contusions heal on their own, without further treatment. However, pain and skin discoloration may take weeks to months to go away.   Home care  · Rest. Avoid heavy lifting, strenuous exertion, or any activity that causes pain.  · Ice the area to reduce pain and swelling. Put ice cubes in a plastic bag or use a cold pack. (Wrap the cold source in a thin towel. Do not place it directly on your skin.) Ice the injured area for 20 minutes every 1-2 hours the first day. Continue with ice packs 3-4 times a day for the next two days, then as needed for the relief of pain and swelling.  · Take any prescribed pain medication. If none was prescribed, take acetaminophen, ibuprofen, or naproxen to control pain.  Follow-up care  Follow up with your healthcare provider, or as directed. Call if you are not better in 1-2 weeks.  When to seek medical advice  Call your healthcare provider for any of the following:  · New or worsening pain  · Increased swelling around the bruise  · Pain spreads to one or both legs  · Weakness or numbness in one or both legs   · Loss of bowel or bladder control  · Numbness in the groin or genital area  · Fever of 100.4°F (38ºC) or higher, or as directed by your healthcare provider  Date Last Reviewed: 6/26/2015 © 2000-2016 [employer]. [address] [place] [address], [place] [address]. All rights reserved. This information is not intended as a substitute for professional medical care. Always follow your healthcare professional's instructions.          Coccyx or Sacrum Contusion    You have a contusion (bruise) of the coccyx or sacrum. The sacrum is the triangular bone at the base of the spine that joins the pelvic bones.  The coccyx (tailbone) is the last bone of the sacrum that hangs down in a point like a small tail. Symptoms include swelling and some bleeding under the skin. This injury generaly takes a few weeks to heal. During that time, the bruise will typically change in color from reddish, to purple-blue, to greenish-yellow, then to yellow-brown. A crack (fracture) in the coccyx bone causes the same symptoms as a contusion in this area. Often, x-rays are not taken since the treatment is the same. If you have fracture of the tailbone as well as a contusion, healing generally takes about four weeks or longer.  Home care  · Try to find a position of comfort. Try lying on your side with your knees bent up towards your chest and a pillow between your knees.  · A bruised tailbone causes pain when sitting. You may try using a donut pillow. This is a foam pillow with a hole in the center to prevent pressure on the tailbone. You can buy this at a pharmacy or orthopedic supply store.  · Ice the injured area to help reduce pain and swelling. Wrap a cold source (ice pack or ice cubes in a plastic bag) in a thin towel. Apply to the bruised area for 20 minutes every 1 to 2 hours the first day. Continue this 3 to 4 times a day until the pain and swelling goes away.  · Unless another medication was prescribed, you can take acetaminophen, ibuprofen, or naproxen to control pain. (If you have chronic liver or kidney disease or ever had a stomach ulcer or GI bleeding, talk with your doctor before using these medicines.)  Follow up  Follow up with your health care provider or our staff as advised. Call if you are not improving within 2 weeks.  When to seek medical advice   Call your health care provider right away if you have any of the following:  · Increased pain or swelling  · Pain that becomes worse or spreads to one or both legs  · Weakness or numbness in one or both legs  · Loss of bowel or bladder control  · Numbness in the groin  area  · Signs of infection, including warmth, drainage, or increased redness  · Frequent bruising for unknown reasons  Date Last Reviewed: 4/29/2015  © 6570-1855 [employer] [address] [place] [address], [place] [address]. All rights reserved. This information is not intended as a substitute for professional medical care. Always follow your healthcare professional's instructions.

## 2017-04-06 NOTE — ED NOTES
Pt was given pain medication and muscle relaxer. Pt questioned what medication was and it both were thoroughly explained. Pt then took medication. Pt stated was her xray results back and what were they. I explained to the patient that she was up for discharge and the provider stated the results were negative and that she needed to follow up with her PCP or orthopedic. Pt then asked why the provider did not tell her about her xray and that I the nurse can not go over her results. I explained to the patient that the provider had already put the patient up for discharge and I was going over her discharge summary and results were negative. I told the patient I would be happy to have the provider go over her xray results with her. I went and informed DIALLO Hernandez that the patient would like to speak with him regarding results. Provider then went and talked to patient about negative results and told her no CT scan was needed at this time and she needed to follow up with doctor.

## 2017-04-06 NOTE — ED PROVIDER NOTES
"SCRIBE #1 NOTE: I, Tip Suman, am scribing for, and in the presence of, DIALLO Inman. I have scribed the entire note.      History      Chief Complaint   Patient presents with    Fall     reports falling and landing on buttocks, reports severe pain to the buttock area and unable to sit straight        Review of patient's allergies indicates:  No Known Allergies     HPI   HPI    4/5/2017, 8:02 PM   History obtained from the patient      History of Present Illness: Manuela Dia is a 36 y.o. female patient who presents to the Emergency Department for an emergent evaluation of acute low back pain. Symptoms are constant in course and moderate in severity. Associated sxs include pain to tailbone. Patient denies any head injury/trauma, LOC, HA, numbness, weakness, dizziness, neck pain, knee pain, hip pain, abd pain, CP, SOB, incontinence, saddle anesthesia, and all other sxs at this time. Pt reports she "slipped and slid down a few steps". Pt reports chronic back pain history from previous back injury. Pre-arrival treatment: none. She has ambulated since the fall. No further complaints or concerns at this time.     Arrival mode: Personal vehicle    PCP: Primary Doctor No       Past Medical History:  Past Medical History:   Diagnosis Date    Anxiety     Bipolar 1 disorder     Chronic back pain     Depression     Spine degeneration        Past Surgical History:  Past Surgical History:   Procedure Laterality Date    HYSTERECTOMY           Family History:  Family History   Problem Relation Age of Onset    Scleroderma Mother     Pulmonary fibrosis Mother     Diabetes Mother     Hypertension Mother     Hyperlipidemia Mother     Cancer Mother     COPD Mother        Social History:  Social History     Social History Main Topics    Smoking status: Current Every Day Smoker     Packs/day: 0.50     Types: Cigarettes    Smokeless tobacco: Never Used    Alcohol use No    Drug use: Yes     Special: Marijuana "      Comment: quit 1 week ago    Sexual activity: Yes     Partners: Male     Birth control/ protection: See Surgical Hx       ROS   Review of Systems   Constitutional: Negative for chills and diaphoresis.   HENT: Negative for facial swelling.    Eyes: Negative for visual disturbance.   Respiratory: Negative for shortness of breath.    Cardiovascular: Negative for chest pain.   Gastrointestinal: Negative for abdominal pain, nausea and vomiting.   Genitourinary: Negative for flank pain, hematuria and pelvic pain.        (+) incontinence   Musculoskeletal: Positive for back pain. Negative for arthralgias, gait problem, joint swelling and neck pain.        (+) buttock pain   Skin: Negative for color change and wound.   Neurological: Negative for dizziness, seizures, syncope, speech difficulty, weakness, light-headedness, numbness and headaches.        (-) saddle anesthesia   Psychiatric/Behavioral: Negative for confusion.     Physical Exam    Initial Vitals   BP Pulse Resp Temp SpO2   04/05/17 1948 04/05/17 1948 04/05/17 1948 04/05/17 1948 04/05/17 1948   102/74 81 18 97.9 °F (36.6 °C) 99 %      Physical Exam  Nursing Notes and Vital Signs Reviewed.  Constitutional: Patient is in mild distress. Awake and alert. Well-developed and well-nourished. Ambulatory.   Head: Atraumatic. Normocephalic.  Eyes: PERRL. EOM intact. Atraumatic.   ENT: Mucous membranes are moist. No face swelling. No scalp hematoma. Atraumatic.   Neck: Non-tender. No vertebral point tenderness.   Cardiovascular: Regular rate.   Pulmonary/Chest: No respiratory distress.   Abdominal: Soft and non-tender.   Musculoskeletal: Moves all extremities. No obvious deformities. Diffuse tenderness to palpation of Lumbar region and sacral area. No swelling, abrasion, redness, or ecchymosis. No focal vertebral point tenderness. No deformity noted. Normal alignment.   Skin: Warm and dry. No traumatic marks on skin.   Neurological:  Alert, awake, and appropriate.  "Normal speech. AAO x 3. 5/5 strength extremities x 4. No acute focal neurological deficits are appreciated. Normal speech. Normal gait.   Psychiatric: Normal affect. Good eye contact. Appropriate in content.    ED Course    Procedures  ED Vital Signs:  Vitals:    04/05/17 1948   BP: 102/74   Pulse: 81   Resp: 18   Temp: 97.9 °F (36.6 °C)   TempSrc: Oral   SpO2: 99%   Weight: 49.9 kg (110 lb)   Height: 5' 6" (1.676 m)       Imaging Results:  Imaging Results         X-Ray Sacrum And Coccyx (Final result) Result time:  04/05/17 20:58:15    Final result by Isak Sabillon MD (04/05/17 20:58:15)    Impression:         Negative.        Electronically signed by: ISAK SABILLON MD  Date:     04/05/17  Time:    20:58     Narrative:    Exam: XR SACRUM AND COCCYX    Clinical History:     W19.XXXA Unspecified fall, initial encounter .    Findings:      No osseous, articular, or soft tissue abnormality demonstrated.            X-Ray Lumbar Spine Ap And Lateral (Final result) Result time:  04/05/17 20:57:26    Final result by Isak Sabillon MD (04/05/17 20:57:26)    Impression:         Possible small old limbus type fracture of the L3 vertebral body.  Mild lumbar scoliotic curvature convex left.        Electronically signed by: ISAK SABILLON MD  Date:     04/05/17  Time:    20:57     Narrative:    Exam: XR LUMBAR SPINE AP AND LATERAL    Clinical History:    Lower back pain..    Findings:      Mother muscular curvature convex left.  Cortical irregularity of the superior anterior surface of the L3 vertebral body possibly representing an old limbus fracture.No significant facet disease.                      The Emergency Provider reviewed the vital signs and test results, which are outlined above.    ED Discussion      9:09 PM: Reassessed pt at this time. Pt is awake, alert, and in NAD. Pt states her condition has improved at this time. Discussed with pt all pertinent ED information and results. Discussed pt dx of contusion of lower back " and plan of tx. Gave pt all f/u and return to the ED instructions. All questions and concerns were addressed at this time. Pt expresses understanding of information and instructions, and is comfortable with plan to discharge. Pt is stable for discharge.    Trauma precautions were discussed with patient and/or family/caretaker; I do not specifically detect any abdominal, thoracic, CNS, orthopedic, or other emergent or life threatening condition and that patient is safe to be discharged.  It was also discussed that despite an unrevealing examination and negative radiographic examination for serious or life threatening injury, these conditions may still exist.  As such, patient should return to ED immediately should they experience, severe or worsening pain, shortness of breath, abdominal pain, headache, vomiting, or any other concern.  It was also discussed that not infrequently, injuries may not be diagnosed during the initial ED visit (such as fractures) and that if the patient discovers a new area of concern, a new area of injury that was not evaluated in the ED, they should return for evaluation as they may have an injury that requires treatment.        ED Medication(s):  Medications   ketorolac tablet 10 mg (10 mg Oral Given 4/5/17 2116)   methocarbamol tablet 500 mg (500 mg Oral Given 4/5/17 2115)       New Prescriptions    METHOCARBAMOL (ROBAXIN) 750 MG TAB    Take 1 tablet (750 mg total) by mouth 2 (two) times daily as needed (Muscle relaxer).    NABUMETONE (RELAFEN) 500 MG TABLET    Take 1 tablet (500 mg total) by mouth 2 (two) times daily as needed for Pain.        Follow-up Information     Follow up with [facility].    Specialty:  Emergency Medicine    Why:  If symptoms worsen in any way. Follow up with your primary care physician in the next 1-2 days for re-evaluation and further management.     Contact information:    [address]  [facility]  [address]  [phone]            Medical Decision Making    Medical Decision Making:   Clinical Tests:   Radiological Study: Ordered and Reviewed           Scribe Attestation:   Scribe #1: I performed the above scribed service and the documentation accurately describes the services I performed. I attest to the accuracy of the note.    Attending:   Physician Attestation Statement for Scribe #1: I, DIALLO Inman, personally performed the services described in this documentation, as scribed by Tip Will, in my presence, and it is both accurate and complete.          Clinical Impression       ICD-10-CM ICD-9-CM   1. Fall W19.XXXA E888.9   2. Contusion of lower back, initial encounter S30.0XXA 922.31   3. Coccyx contusion, initial encounter S30.0XXA 922.32       Disposition:   Disposition: Discharged  Condition: Stable         Aashish Maurice PA-C  04/05/17 2117       Aashish Maurice PA-C  04/05/17 2134
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
## 2017-06-10 ENCOUNTER — HOSPITAL ENCOUNTER (EMERGENCY)
Facility: HOSPITAL | Age: 37
Discharge: HOME | End: 2017-06-10
Attending: EMERGENCY MEDICINE
Payer: MEDICAID

## 2017-06-10 VITALS
HEIGHT: 66 IN | RESPIRATION RATE: 20 BRPM | WEIGHT: 115 LBS | OXYGEN SATURATION: 95 % | DIASTOLIC BLOOD PRESSURE: 73 MMHG | TEMPERATURE: 98 F | HEART RATE: 60 BPM | BODY MASS INDEX: 18.48 KG/M2 | SYSTOLIC BLOOD PRESSURE: 117 MMHG
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
## 2017-06-10 DIAGNOSIS — T07.XXXA ABRASIONS OF MULTIPLE SITES: ICD-10-CM

## 2017-06-10 DIAGNOSIS — T07.XXXA MULTIPLE CONTUSIONS: ICD-10-CM

## 2017-06-10 PROCEDURE — 99283 EMERGENCY DEPT VISIT LOW MDM: CPT

## 2017-06-10 RX ORDER — NAPROXEN 375 MG/1
375 TABLET ORAL 2 TIMES DAILY WITH MEALS
Qty: 30 TABLET | Refills: 0 | Status: SHIPPED | OUTPATIENT
Start: 2017-06-10 | End: 2017-10-29 | Stop reason: ALTCHOICE

## 2017-06-10 RX ORDER — CYCLOBENZAPRINE HCL 10 MG
10 TABLET ORAL 3 TIMES DAILY PRN
Qty: 15 TABLET | Refills: 0 | Status: SHIPPED | OUTPATIENT
Start: 2017-06-10 | End: 2017-06-15

## 2017-06-11 NOTE — ED PROVIDER NOTES
SCRIBE #1 NOTE: I, Maribel Samaniego, am scribing for, and in the presence of, Darcy Myles MD. I have scribed the entire note.      History      Chief Complaint   Patient presents with    Fall     reports falling off ladder earlier today, bruising and abrasion noted to the right shin, c/o pain to the right forearm and right ribcage area        Review of patient's allergies indicates:  No Known Allergies     HPI   HPI    6/10/2017, 11:16 PM   History obtained from the patient      History of Present Illness: Manuela Dia is a 36 y.o. female patient who presents to the Emergency Department for RLE pain which onset suddenly when the pt fell off of a 5 foot ladder two hours PTA. Symptoms are constant and moderate in severity. No mitigating or exacerbating factors reported. Associated sxs include right-sided rib pain and an abrasion to the RLE. Patient denies any head trauma/injury, HA, shortness of breath, neck pain, numbness or weakness, or difficulty walking, dizziness, LOC, hip pain, back pain, and all other sxs at this time. No further complaints or concerns at this time.         Arrival mode: Personal vehicle      PCP: Primary Doctor No       Past Medical History:  Past Medical History:   Diagnosis Date    Anxiety     Bipolar 1 disorder     Chronic back pain     Depression     Spine degeneration        Past Surgical History:  Past Surgical History:   Procedure Laterality Date    HYSTERECTOMY           Family History:  Family History   Problem Relation Age of Onset    Scleroderma Mother     Pulmonary fibrosis Mother     Diabetes Mother     Hypertension Mother     Hyperlipidemia Mother     Cancer Mother     COPD Mother        Social History:  Social History     Social History Main Topics    Smoking status: Current Every Day Smoker     Packs/day: 0.50     Types: Cigarettes    Smokeless tobacco: Never Used    Alcohol use No    Drug use:      Types: Marijuana      Comment: quit 1 week ago     Sexual activity: Yes     Partners: Male     Birth control/ protection: See Surgical Hx       ROS   Review of Systems   Constitutional: Negative for fever.   HENT: Negative for sore throat.    Respiratory: Negative for shortness of breath.    Cardiovascular: Negative for chest pain.   Gastrointestinal: Negative for nausea.   Genitourinary: Negative for dysuria.   Musculoskeletal: Negative for back pain.        (+) RLE pain  (+) R rib pain   Skin: Negative for rash.        (+) abrasion RLE   Neurological: Negative for weakness.   Hematological: Does not bruise/bleed easily.   All other systems reviewed and are negative.      Physical Exam      Initial Vitals [06/10/17 2211]   BP Pulse Resp Temp SpO2   117/73 60 20 98.1 °F (36.7 °C) 95 %      Physical Exam  Nursing Notes and Vital Signs Reviewed.  Constitutional: Patient is in no apparent distress. Well-developed and well-nourished. Pt can ambulate w/o difficulty.  Head: Atraumatic. Normocephalic.  Eyes: PERRL. EOM intact. Conjunctivae are not pale. No scleral icterus.  ENT: Mucous membranes are moist. Oropharynx is clear and symmetric.    Neck: Supple. Full ROM. No lymphadenopathy.  Cardiovascular: Regular rate. Regular rhythm. No murmurs, rubs, or gallops. Distal pulses are 2+ and symmetric.  Pulmonary/Chest: No respiratory distress. Clear to auscultation bilaterally. No wheezing, rales, or rhonchi. No chest wall tenderness or deformity.   Abdominal: Soft and non-distended.  There is no tenderness.  No rebound, guarding, or rigidity. Good bowel sounds.  Musculoskeletal: Moves all extremities. No obvious deformities. No edema. No calf tenderness. No midline back pain. No bruising or crepitance to posterior R rib cage region. No mid-line back tenderness.  Skin: Warm and dry. Mild bruising to R shin,no deformities, tenderness, or erythema. abrasion to dorsum aspect of mid forearm.  Neurological:  Alert, awake, and appropriate.  Normal speech.  No acute focal  "neurological deficits are appreciated.  Psychiatric: Normal affect. Good eye contact. Appropriate in content.    ED Course    Procedures  ED Vital Signs:  Vitals:    06/10/17 2211   BP: 117/73   Pulse: 60   Resp: 20   Temp: 98.1 °F (36.7 °C)   TempSrc: Oral   SpO2: 95%   Weight: 52.2 kg (115 lb)   Height: 5' 6" (1.676 m)            The Emergency Provider reviewed the vital signs and test results, which are outlined above.    ED Discussion     11:20 PM:  Discussed with pt all pertinent ED information. Discussed pt dx and plan of tx. Gave pt all f/u and return to the ED instructions. All questions and concerns were addressed at this time. Pt expresses understanding of information and instructions, and is comfortable with plan to discharge. Pt is stable for discharge.    Trauma precautions were discussed with patient and/or family/caretaker; I do not specifically detect any abdominal, thoracic, CNS, orthopedic, or other emergent or life threatening condition and that patient is safe to be discharged.  It was also discussed that despite an unrevealing examination and negative radiographic examination for serious or life threatening injury, these conditions may still exist.  As such, patient should return to ED immediately should they experience, severe or worsening pain, shortness of breath, abdominal pain, headache, vomiting, or any other concern.  It was also discussed that not infrequently, injuries may not be diagnosed during the initial ED visit (such as fractures) and that if the patient discovers a new area of concern, a new area of injury that was not evaluated in the ED, they should return for evaluation as they may have an injury that requires treatment.      ED Medication(s):  Medications - No data to display    Discharge Medication List as of 6/10/2017 11:22 PM      START taking these medications    Details   cyclobenzaprine (FLEXERIL) 10 MG tablet Take 1 tablet (10 mg total) by mouth 3 (three) times daily " as needed for Muscle spasms., Starting Sat 6/10/2017, Until Thu 6/15/2017, Print      naproxen (NAPROSYN) 375 MG tablet Take 1 tablet (375 mg total) by mouth 2 (two) times daily with meals., Starting Sat 6/10/2017, Print             Follow-up Information     [facility]. Schedule an appointment as soon as possible for a visit in 2 days.    Why:  Return to the Emergency Room, If symptoms worsen  Contact information:  [address] [place] [address]  [phone]                     Medical Decision Making              Scribe Attestation:   Scribe #1: I performed the above scribed service and the documentation accurately describes the services I performed. I attest to the accuracy of the note.    Attending:   Physician Attestation Statement for Scribe #1: I, Darcy Myles MD, personally performed the services described in this documentation, as scribed by Maribel Samaniego, in my presence, and it is both accurate and complete.          Clinical Impression       ICD-10-CM ICD-9-CM   1. Accidental fall from ladder, initial encounter W11.XXXA E881.0   2. Abrasions of multiple sites T14.8 919.0   3. Multiple contusions T14.8 924.8       Disposition:   Disposition: Discharged  Condition: Stable         Darcy Myles MD  06/11/17 0237

## 2017-10-08 ENCOUNTER — HOSPITAL ENCOUNTER (EMERGENCY)
Facility: HOSPITAL | Age: 37
Discharge: HOME | End: 2017-10-08
Attending: EMERGENCY MEDICINE
Payer: MEDICAID

## 2017-10-08 VITALS
HEART RATE: 63 BPM | DIASTOLIC BLOOD PRESSURE: 76 MMHG | TEMPERATURE: 98 F | WEIGHT: 125 LBS | RESPIRATION RATE: 18 BRPM | OXYGEN SATURATION: 100 % | SYSTOLIC BLOOD PRESSURE: 111 MMHG | HEIGHT: 66 IN | BODY MASS INDEX: 20.09 KG/M2

## 2017-10-08 DIAGNOSIS — S16.1XXA STRAIN OF NECK MUSCLE, INITIAL ENCOUNTER: Primary | ICD-10-CM

## 2017-10-08 PROCEDURE — 99283 EMERGENCY DEPT VISIT LOW MDM: CPT | Mod: 25

## 2017-10-08 PROCEDURE — 96372 THER/PROPH/DIAG INJ SC/IM: CPT

## 2017-10-08 PROCEDURE — 63600175 PHARM REV CODE 636 W HCPCS: Performed by: EMERGENCY MEDICINE

## 2017-10-08 RX ORDER — KETOROLAC TROMETHAMINE 30 MG/ML
60 INJECTION, SOLUTION INTRAMUSCULAR; INTRAVENOUS
Status: COMPLETED | OUTPATIENT
Start: 2017-10-08 | End: 2017-10-08

## 2017-10-08 RX ORDER — CYCLOBENZAPRINE HCL 10 MG
10 TABLET ORAL 3 TIMES DAILY PRN
Qty: 15 TABLET | Refills: 0 | Status: SHIPPED | OUTPATIENT
Start: 2017-10-08 | End: 2017-10-13

## 2017-10-08 RX ORDER — NAPROXEN 375 MG/1
375 TABLET ORAL 2 TIMES DAILY WITH MEALS
Qty: 30 TABLET | Refills: 0 | Status: SHIPPED | OUTPATIENT
Start: 2017-10-08 | End: 2017-10-29 | Stop reason: ALTCHOICE

## 2017-10-08 RX ORDER — ORPHENADRINE CITRATE 30 MG/ML
60 INJECTION INTRAMUSCULAR; INTRAVENOUS
Status: COMPLETED | OUTPATIENT
Start: 2017-10-08 | End: 2017-10-08

## 2017-10-08 RX ADMIN — KETOROLAC TROMETHAMINE 60 MG: 30 INJECTION, SOLUTION INTRAMUSCULAR at 03:10

## 2017-10-08 RX ADMIN — ORPHENADRINE CITRATE 60 MG: 30 INJECTION INTRAMUSCULAR; INTRAVENOUS at 03:10

## 2017-10-08 NOTE — ED PROVIDER NOTES
SCRIBE #1 NOTE: I, Martha Law, am scribing for, and in the presence of, Cuong Davis MD. I have scribed the entire note.      History      Chief Complaint   Patient presents with    Neck Pain     pt c/o neck pain and difficulty straighening neck       Review of patient's allergies indicates:  No Known Allergies     HPI   HPI    10/8/2017, 3:32 AM   History obtained from the patient      History of Present Illness: Manuela Dia is a 36 y.o. female patient who presents to the Emergency Department for R sided neck pain which onset gradually while at work 5 pm yesterday. Symptoms are constant and mild in severity. No mitigating or exacerbating factors reported. No associated sxs reported. Patient denies direct neck trauma/falls, neck stiffness, paresthesias, extremity weakness/numbness, HA, and all other sxs at this time. No further complaints or concerns at this time.       Arrival mode: Personal vehicle    PCP: Primary Doctor No       Past Medical History:  Past Medical History:   Diagnosis Date    Anxiety     Bipolar 1 disorder     Chronic back pain     Depression     Spine degeneration        Past Surgical History:  Past Surgical History:   Procedure Laterality Date    HYSTERECTOMY           Family History:  Family History   Problem Relation Age of Onset    Scleroderma Mother     Pulmonary fibrosis Mother     Diabetes Mother     Hypertension Mother     Hyperlipidemia Mother     Cancer Mother     COPD Mother        Social History:  Social History     Social History Main Topics    Smoking status: Current Every Day Smoker     Packs/day: 0.50     Types: Cigarettes    Smokeless tobacco: Never Used    Alcohol use No    Drug use: No    Sexual activity: Yes     Partners: Male     Birth control/ protection: See Surgical Hx       ROS   Review of Systems   Constitutional: Negative for fever.   HENT: Negative for sore throat.    Respiratory: Negative for shortness of breath.    Cardiovascular:  "Negative for chest pain.   Gastrointestinal: Negative for nausea.   Genitourinary: Negative for dysuria.   Musculoskeletal: Positive for neck pain. Negative for back pain and neck stiffness.   Skin: Negative for rash.   Neurological: Negative for dizziness, weakness, numbness and headaches.        (-) paresthesias   Hematological: Does not bruise/bleed easily.   All other systems reviewed and are negative.      Physical Exam      Initial Vitals [10/08/17 0329]   BP Pulse Resp Temp SpO2   109/79 61 20 98.1 °F (36.7 °C) 100 %      MAP       89          Physical Exam  Nursing Notes and Vital Signs Reviewed.  Constitutional: Patient is in no acute distress. Awake and alert. Well-developed and well-nourished.  Head: Atraumatic. Normocephalic.  Eyes: PERRL. EOM intact. Conjunctivae are not pale. No scleral icterus.  ENT: Mucous membranes are moist. Oropharynx is clear and symmetric.    Neck: Supple. Full ROM. R sided paraspinal muscle spasms. No cervical midline bony tenderness, deformities, or step-offs.  Cardiovascular: Regular rate. Regular rhythm. No murmurs, rubs, or gallops.   Pulmonary/Chest: No respiratory distress. Clear to auscultation bilaterally. No wheezing, rales, or rhonchi.  Abdominal: Soft and non-distended.  There is no tenderness.  No rebound, guarding, or rigidity.  Good bowel sounds.    Musculoskeletal: Moves all extremities. No obvious deformities. No edema. No calf tenderness.  Skin: Warm and dry.  Neurological:  Alert, awake, and appropriate.  Normal speech.  No acute focal neurological deficits are appreciated.  Psychiatric: Normal affect. Good eye contact. Appropriate in content.    ED Course    Procedures  ED Vital Signs:  Vitals:    10/08/17 0329   BP: 109/79   Pulse: 61   Resp: 20   Temp: 98.1 °F (36.7 °C)   TempSrc: Oral   SpO2: 100%   Weight: 56.7 kg (125 lb)   Height: 5' 6" (1.676 m)     The Emergency Provider reviewed the vital signs and test results, which are outlined above.    ED " Discussion     3:36 AM: Reassessed pt at this time. Discussed pt dx and plan of tx. Informed pt to follow up with PCP. All questions and concerns were addressed at this time. Pt expresses understanding of information and instructions, and is comfortable with plan to discharge. Pt is stable for discharge.    I discussed with patient that evaluation in the ED does not suggest any emergent or life threatening medical conditions requiring immediate intervention beyond what was provided in the ED, and I believe patient is safe for discharge.  Regardless, an unremarkable evaluation in the ED does not preclude the development or presence of a serious of life threatening condition. As such, patient was instructed to return immediately for any worsening or change in current symptoms.    ED Medication(s):  Medications   ketorolac injection 60 mg (not administered)   orphenadrine injection 60 mg (not administered)       New Prescriptions    CYCLOBENZAPRINE (FLEXERIL) 10 MG TABLET    Take 1 tablet (10 mg total) by mouth 3 (three) times daily as needed for Muscle spasms.    NAPROXEN (NAPROSYN) 375 MG TABLET    Take 1 tablet (375 mg total) by mouth 2 (two) times daily with meals.       Follow-up Information     [facility] in 2 days.    Contact information:  [address] [address] [phone]                    Medical Decision Making              Scribe Attestation:   Scribe #1: I performed the above scribed service and the documentation accurately describes the services I performed. I attest to the accuracy of the note.    Attending:   Physician Attestation Statement for Scribe #1: I, Cuong Davis MD, personally performed the services described in this documentation, as scribed by Martha Law, in my presence, and it is both accurate and complete.          Clinical Impression       ICD-10-CM ICD-9-CM   1. Strain of neck muscle, initial encounter S16.1XXA 847.0       Disposition:    Disposition: Discharged  Condition: Stable         Cuong Davis MD  10/08/17 7849

## 2017-10-29 ENCOUNTER — HOSPITAL ENCOUNTER (EMERGENCY)
Facility: HOSPITAL | Age: 37
Discharge: HOME | End: 2017-10-29
Attending: EMERGENCY MEDICINE
Payer: MEDICAID

## 2017-10-29 VITALS
DIASTOLIC BLOOD PRESSURE: 67 MMHG | HEART RATE: 93 BPM | TEMPERATURE: 98 F | WEIGHT: 125 LBS | RESPIRATION RATE: 18 BRPM | SYSTOLIC BLOOD PRESSURE: 98 MMHG | HEIGHT: 67 IN | OXYGEN SATURATION: 99 % | BODY MASS INDEX: 19.62 KG/M2

## 2017-10-29 DIAGNOSIS — S90.32XA CONTUSION OF LEFT FOOT, INITIAL ENCOUNTER: ICD-10-CM

## 2017-10-29 DIAGNOSIS — M79.672 ACUTE FOOT PAIN, LEFT: Primary | ICD-10-CM

## 2017-10-29 PROCEDURE — 25000003 PHARM REV CODE 250: Performed by: NURSE PRACTITIONER

## 2017-10-29 PROCEDURE — 99283 EMERGENCY DEPT VISIT LOW MDM: CPT

## 2017-10-29 RX ORDER — KETOROLAC TROMETHAMINE 10 MG/1
10 TABLET, FILM COATED ORAL
Status: COMPLETED | OUTPATIENT
Start: 2017-10-29 | End: 2017-10-29

## 2017-10-29 RX ORDER — ETODOLAC 400 MG/1
400 TABLET, FILM COATED ORAL EVERY 8 HOURS PRN
Qty: 20 TABLET | Refills: 0 | Status: SHIPPED | OUTPATIENT
Start: 2017-10-29 | End: 2018-09-12 | Stop reason: CLARIF

## 2017-10-29 RX ADMIN — KETOROLAC TROMETHAMINE 10 MG: 10 TABLET, FILM COATED ORAL at 10:10

## 2017-10-30 NOTE — ED PROVIDER NOTES
SCRIBE #1 NOTE: I, Isak Noriega, am scribing for, and in the presence of, Mayo Tuttle NP. I have scribed the entire note.      History      Chief Complaint   Patient presents with    Foot Injury     Pt reports dresser fell on L ft just PTA.       Review of patient's allergies indicates:  No Known Allergies     HPI   HPI    10/29/2017, 10:10 PM   History obtained from the patient      History of Present Illness: Manuela Dia is a 36 y.o. female patient who presents to the Emergency Department for left foot pain which onset suddenly PTA after a dresser fell on her foot. Symptoms are constant and moderate in severity. No mitigating or exacerbating factors reported. No associated sx reported. Patient denies any numbness, weakness, back pain, neck pain, n/v/d, joint swelling, and all other sxs at this time. No further complaints or concerns at this time.         Arrival mode: Personal vehicle     PCP: Primary Doctor No       Past Medical History:  Past Medical History:   Diagnosis Date    Anxiety     Bipolar 1 disorder     Chronic back pain     Depression     Spine degeneration        Past Surgical History:  Past Surgical History:   Procedure Laterality Date    HYSTERECTOMY           Family History:  Family History   Problem Relation Age of Onset    Scleroderma Mother     Pulmonary fibrosis Mother     Diabetes Mother     Hypertension Mother     Hyperlipidemia Mother     Cancer Mother     COPD Mother        Social History:  Social History     Social History Main Topics    Smoking status: Current Every Day Smoker     Packs/day: 0.50     Types: Cigarettes    Smokeless tobacco: Never Used    Alcohol use No    Drug use: No    Sexual activity: Yes     Partners: Male     Birth control/ protection: See Surgical Hx       ROS   Review of Systems   Constitutional: Negative for chills and fever.   HENT: Negative for sore throat.    Respiratory: Negative for shortness of breath.    Cardiovascular: Negative for  chest pain.   Gastrointestinal: Negative for diarrhea, nausea and vomiting.   Genitourinary: Negative for dysuria.   Musculoskeletal: Negative for back pain, joint swelling and neck pain.        + left foot pain   Skin: Negative for rash.   Neurological: Negative for dizziness, weakness, light-headedness, numbness and headaches.   Hematological: Does not bruise/bleed easily.       Physical Exam      Initial Vitals [10/29/17 2143]   BP Pulse Resp Temp SpO2   98/67 93 18 98 °F (36.7 °C) 99 %      MAP       77.33          Physical Exam  Nursing Notes and Vital Signs Reviewed.  Constitutional: Patient is in no acute distress. Awake and alert. Well-developed and well-nourished.  Head: Atraumatic. Normocephalic.  Eyes: PERRL. EOM intact. Conjunctivae are not pale. No scleral icterus.  ENT: Mucous membranes are moist. Oropharynx is clear and symmetric.    Neck: Supple. Full ROM. No lymphadenopathy.  Cardiovascular: Regular rate. Regular rhythm. No murmurs, rubs, or gallops. Distal pulses are 2+ and symmetric.  Pulmonary/Chest: No respiratory distress. Clear to auscultation bilaterally. No wheezing, rales, or rhonchi.  Abdominal: Soft and non-distended.  There is no tenderness.  No rebound, guarding, or rigidity.    Musculoskeletal: Moves all extremities. No obvious deformities. No edema. Tenderness over the dorsum of the left foot with slight bruising.  Skin: Warm and dry.  Neurological:  Alert, awake, and appropriate.  Normal speech.  No acute focal neurological deficits are appreciated.  Psychiatric: Normal affect. Good eye contact. Appropriate in content.    ED Course    Orthopedic Injury  Date/Time: 10/29/2017 10:12 PM  Performed by: ANIA CANDELARIO  Authorized by: ANIA CANDELARIO     Injury:     Injury location:  Foot    Location details:  Left foot    Injury type:  Soft tissue      Pre-procedure assessment:     Distal perfusion: normal      Neurological function: normal        Selections made in this section will also  "lock the Injury type section above.:     Immobilization:  Splint    Splint type: ACE wrap.    Supplies used:  Elastic bandage  Post-procedure assessment:     Neurovascular status: Neurovascularly intact      Distal perfusion: normal      Neurological function: normal      Patient tolerance:  Patient tolerated the procedure well with no immediate complications      ED Vital Signs:  Vitals:    10/29/17 2143   BP: 98/67   Pulse: 93   Resp: 18   Temp: 98 °F (36.7 °C)   TempSrc: Oral   SpO2: 99%   Weight: 56.7 kg (125 lb)   Height: 5' 7" (1.702 m)           Imaging Results:  Imaging Results          X-Ray Foot Complete Left (Final result)  Result time 10/29/17 22:02:43    Final result by Behzad Hayden MD (10/29/17 22:02:43)                 Impression:         Negative.      Electronically signed by: BEHZAD HAYDEN MD  Date:     10/29/17  Time:    22:02              Narrative:    Exam: XR FOOT COMPLETE 3 VIEW LEFT,    Date:  10/29/17 21:54:00    History: Left foot pain.      Comparison:  No prior  studies available for comparison.    Findings:     No fracture or dislocation.  Joint spaces are well-preserved.  Soft tissues are normal.                                      The Emergency Provider reviewed the vital signs and test results, which are outlined above.    ED Discussion     10:14 PM: Discussed with pt all pertinent ED information and results. Discussed pt dx and plan of tx. Gave pt all f/u and return to the ED instructions. All questions and concerns were addressed at this time. Pt expresses understanding of information and instructions, and is comfortable with plan to discharge. Pt is stable for discharge.    I discussed with patient and/or family/caretaker that negative X-ray does not rule out occult fracture or other soft tissue injury.  Persistent pain greater than 7-10 days or increased pain requires follow up, specifically with orthopedics.     ED Medication(s):  Medications   ketorolac tablet 10 " mg (10 mg Oral Given 10/29/17 2227)       Discharge Medication List as of 10/29/2017 10:16 PM      START taking these medications    Details   etodolac (LODINE) 400 MG tablet Take 1 tablet (400 mg total) by mouth every 8 (eight) hours as needed., Starting Sun 10/29/2017, Print             Follow-up Information     [facility] .    Specialty:  Emergency Medicine  Why:  As needed, If symptoms worsen  Contact information:  [address]  [facility] [address] [phone]                   Medical Decision Making    Medical Decision Making:   Clinical Tests:   Radiological Study: Ordered and Reviewed           Scribe Attestation:   Scribe #1: I performed the above scribed service and the documentation accurately describes the services I performed. I attest to the accuracy of the note.    Attending:   Physician Attestation Statement for Scribe #1: I, Mayo Tuttle NP, personally performed the services described in this documentation, as scribed by Isak Noriega, in my presence, and it is both accurate and complete.          Clinical Impression       ICD-10-CM ICD-9-CM   1. Acute foot pain, left M79.672 729.5   2. Contusion of left foot, initial encounter S90.32XA 924.20       Disposition:   Disposition: Discharged  Condition: Stable           Mayo Tuttle NP  10/30/17 9093

## 2018-02-08 ENCOUNTER — HOSPITAL ENCOUNTER (EMERGENCY)
Facility: HOSPITAL | Age: 38
Discharge: HOME | End: 2018-02-08
Attending: EMERGENCY MEDICINE
Payer: MEDICAID

## 2018-02-08 VITALS
WEIGHT: 130 LBS | OXYGEN SATURATION: 100 % | RESPIRATION RATE: 16 BRPM | DIASTOLIC BLOOD PRESSURE: 75 MMHG | TEMPERATURE: 99 F | HEART RATE: 87 BPM | SYSTOLIC BLOOD PRESSURE: 118 MMHG | HEIGHT: 66 IN | BODY MASS INDEX: 20.89 KG/M2

## 2018-02-08 DIAGNOSIS — M54.50 ACUTE BILATERAL LOW BACK PAIN WITHOUT SCIATICA: Primary | ICD-10-CM

## 2018-02-08 DIAGNOSIS — R06.02 SOB (SHORTNESS OF BREATH): ICD-10-CM

## 2018-02-08 PROCEDURE — 25000003 PHARM REV CODE 250: Performed by: EMERGENCY MEDICINE

## 2018-02-08 PROCEDURE — 93005 ELECTROCARDIOGRAM TRACING: CPT

## 2018-02-08 PROCEDURE — 99284 EMERGENCY DEPT VISIT MOD MDM: CPT | Mod: 25

## 2018-02-08 PROCEDURE — 93010 ELECTROCARDIOGRAM REPORT: CPT | Mod: ,,, | Performed by: INTERNAL MEDICINE

## 2018-02-08 PROCEDURE — 63600175 PHARM REV CODE 636 W HCPCS: Performed by: EMERGENCY MEDICINE

## 2018-02-08 PROCEDURE — 96372 THER/PROPH/DIAG INJ SC/IM: CPT

## 2018-02-08 RX ORDER — HYDROCODONE BITARTRATE AND ACETAMINOPHEN 5; 325 MG/1; MG/1
1 TABLET ORAL EVERY 4 HOURS PRN
Qty: 11 TABLET | Refills: 0 | Status: SHIPPED | OUTPATIENT
Start: 2018-02-08 | End: 2018-02-18

## 2018-02-08 RX ORDER — ORPHENADRINE CITRATE 30 MG/ML
60 INJECTION INTRAMUSCULAR; INTRAVENOUS
Status: COMPLETED | OUTPATIENT
Start: 2018-02-08 | End: 2018-02-08

## 2018-02-08 RX ORDER — CYCLOBENZAPRINE HCL 10 MG
10 TABLET ORAL 3 TIMES DAILY PRN
Qty: 15 TABLET | Refills: 0 | Status: SHIPPED | OUTPATIENT
Start: 2018-02-08 | End: 2018-02-13

## 2018-02-08 RX ORDER — HYDROCODONE BITARTRATE AND ACETAMINOPHEN 5; 325 MG/1; MG/1
1 TABLET ORAL
Status: COMPLETED | OUTPATIENT
Start: 2018-02-08 | End: 2018-02-08

## 2018-02-08 RX ORDER — KETOROLAC TROMETHAMINE 30 MG/ML
60 INJECTION, SOLUTION INTRAMUSCULAR; INTRAVENOUS
Status: COMPLETED | OUTPATIENT
Start: 2018-02-08 | End: 2018-02-08

## 2018-02-08 RX ORDER — LORAZEPAM 1 MG/1
1 TABLET ORAL
Status: COMPLETED | OUTPATIENT
Start: 2018-02-08 | End: 2018-02-08

## 2018-02-08 RX ADMIN — KETOROLAC TROMETHAMINE 60 MG: 30 INJECTION, SOLUTION INTRAMUSCULAR at 12:02

## 2018-02-08 RX ADMIN — ORPHENADRINE CITRATE 60 MG: 30 INJECTION INTRAMUSCULAR; INTRAVENOUS at 12:02

## 2018-02-08 RX ADMIN — LORAZEPAM 1 MG: 1 TABLET ORAL at 01:02

## 2018-02-08 RX ADMIN — HYDROCODONE BITARTRATE AND ACETAMINOPHEN 1 TABLET: 5; 325 TABLET ORAL at 02:02

## 2018-02-08 NOTE — ED PROVIDER NOTES
SCRIBE #1 NOTE: I, Thanh Brink, am scribing for, and in the presence of, Cuong Davis MD. I have scribed the entire note.      History      Chief Complaint   Patient presents with    Back Pain     pt reports lower back pain radiating to right lower abdomen with SOB since yesterday    Shortness of Breath       Review of patient's allergies indicates:  No Known Allergies     HPI   HPI    2/8/2018, 12:49 PM   History obtained from the patient      History of Present Illness: Manuela Dia is a 37 y.o. female patient w/ PMHx of Anxiety presents to the Emergency Department for back pain which onset gradually 1 day ago. Pt denies any trauma/ injury. Symptoms are constant and moderate in severity. Pain radiates to right lower abd.  Pain is worsened w/ deep inspiration. No mitigating factors reported. Pt is also complaining of SOB (onset this AM), which the pt believes is related to Anxiety. Patient denies any chest pain, fever, chills, saddle anesthesia, bladder/ bowel incontinence, n/v/d, and all other sxs at this time. No further complaints or concerns at this time.       Arrival mode: Personal vehicle    PCP: Primary Doctor No       Past Medical History:  Past Medical History:   Diagnosis Date    Anxiety     Bipolar 1 disorder     Chronic back pain     Depression     Spine degeneration        Past Surgical History:  Past Surgical History:   Procedure Laterality Date    HYSTERECTOMY           Family History:  Family History   Problem Relation Age of Onset    Scleroderma Mother     Pulmonary fibrosis Mother     Diabetes Mother     Hypertension Mother     Hyperlipidemia Mother     Cancer Mother     COPD Mother        Social History:  Social History     Social History Main Topics    Smoking status: Current Every Day Smoker     Packs/day: 0.50     Types: Cigarettes    Smokeless tobacco: Never Used    Alcohol use No    Drug use: No    Sexual activity: Yes     Partners: Male     Birth control/  protection: See Surgical Hx       ROS   Review of Systems   Constitutional: Negative for chills, diaphoresis and fever.        - recent trauma/ injury   Respiratory: Positive for shortness of breath. Negative for cough.    Cardiovascular: Negative for chest pain.   Gastrointestinal: Negative for abdominal pain, diarrhea, nausea and vomiting.        - bowel incontinence   Genitourinary: Negative for difficulty urinating, dysuria, flank pain and hematuria.        - bladder incontinence   Musculoskeletal: Positive for back pain. Negative for arthralgias and neck pain.   Skin: Negative for rash.   Neurological: Negative for dizziness, syncope, weakness, light-headedness, numbness and headaches.        - saddle anesthesia   All other systems reviewed and are negative.      Physical Exam      Initial Vitals [02/08/18 1230]   BP Pulse Resp Temp SpO2   118/75 87 16 98.5 °F (36.9 °C) 100 %      MAP       89.33          Physical Exam  Nursing Notes and Vital Signs Reviewed.  Constitutional: Patient is in no apparent distress. Well-developed and well-nourished.  Head: Atraumatic. Normocephalic.  Eyes: PERRL. EOM intact. Conjunctivae are not pale. No scleral icterus.  ENT: Mucous membranes are moist. Oropharynx is clear and symmetric.    Neck: Supple. Full ROM. No lymphadenopathy.  Cardiovascular: Regular rate. Regular rhythm. No murmurs, rubs, or gallops. Distal pulses are 2+ and symmetric.  Pulmonary/Chest: No respiratory distress. Clear to auscultation bilaterally. No wheezing or rales.  Abdominal: Soft and non-distended.  There is no tenderness.  No rebound, guarding, or rigidity. Good bowel sounds.  Genitourinary: No CVA tenderness  Musculoskeletal: Moves all extremities. No obvious deformities. No edema. No calf tenderness.  Skin: Warm and dry.  Neurological:  Alert, awake, and appropriate.  Normal speech.  No acute focal neurological deficits are appreciated.  Psychiatric: Anxious.  Good eye contact. Appropriate in  "content.    ED Course    Procedures  ED Vital Signs:  Vitals:    02/08/18 1230   BP: 118/75   Pulse: 87   Resp: 16   Temp: 98.5 °F (36.9 °C)   TempSrc: Oral   SpO2: 100%   Weight: 59 kg (130 lb)   Height: 5' 6" (1.676 m)         Imaging Results:  Imaging Results          X-Ray Lumbar Spine Ap And Lateral (Final result)  Result time 02/08/18 13:30:40    Final result by Isak Mack MD (02/08/18 13:30:40)                 Impression:      See above.        Electronically signed by: ISAK MACK MD  Date:     02/08/18  Time:    13:30              Narrative:    History:  Back pain    Comparison:  4/5/17    Results: Three views of the lumbar spine.       Overall alignment is well maintained.  No evidence of spondylolysis or spondylolisthesis. Limits vertebra is suspected at L3. Disk and vertebral body heights are normal.      Extraosseous structures are intact.                             The EKG was ordered, reviewed, and independently interpreted by the ED provider.  Interpretation time: 12:40  Rate: 90 BPM  Rhythm: normal sinus rhythm  Interpretation: Possible left atrial enlargement. Nonspecific St abnormality. No STEMI.           The Emergency Provider reviewed the vital signs and test results, which are outlined above.    ED Discussion     1:55 PM:  Discussed with pt all pertinent ED information and results. Discussed pt dx and plan of tx. Gave pt all f/u and return to the ED instructions. All questions and concerns were addressed at this time. Pt expresses understanding of information and instructions, and is comfortable with plan to discharge. Pt is stable for discharge.    I discussed with patient and/or family/caretaker that evaluation in the ED does not suggest any emergent or life threatening medical conditions requiring immediate intervention beyond what was provided in the ED, and I believe patient is safe for discharge.  Regardless, an unremarkable evaluation in the ED does not preclude the development " or presence of a serious of life threatening condition. As such, patient was instructed to return immediately for any worsening or change in current symptoms.      ED Medication(s):  Medications   ketorolac injection 60 mg (60 mg Intramuscular Given 2/8/18 1258)   orphenadrine injection 60 mg (60 mg Intramuscular Given 2/8/18 1259)   LORazepam tablet 1 mg (1 mg Oral Given 2/8/18 1342)   hydrocodone-acetaminophen 5-325mg per tablet 1 tablet (1 tablet Oral Given 2/8/18 1414)       Discharge Medication List as of 2/8/2018  1:53 PM          Follow-up Information     [facility] in 2 days.    Contact information:  [address] [address] [phone]                     Medical Decision Making    Medical Decision Making:   Clinical Tests:   Radiological Study: Reviewed and Ordered           Scribe Attestation:   Scribe #1: I performed the above scribed service and the documentation accurately describes the services I performed. I attest to the accuracy of the note.    Attending:   Physician Attestation Statement for Scribe #1: I, Cuong Davis MD, personally performed the services described in this documentation, as scribed by Thanh Brink, in my presence, and it is both accurate and complete.          Clinical Impression       ICD-10-CM ICD-9-CM   1. Acute bilateral low back pain without sciatica M54.5 724.2     338.19   2. SOB (shortness of breath) R06.02 786.05       Disposition:   Disposition: Discharged  Condition: Stable         Cuong Davis MD  02/08/18 5074

## 2018-06-28 ENCOUNTER — HOSPITAL ENCOUNTER (EMERGENCY)
Facility: HOSPITAL | Age: 38
Discharge: HOME | End: 2018-06-28
Payer: MEDICAID

## 2018-06-28 VITALS
HEIGHT: 66 IN | DIASTOLIC BLOOD PRESSURE: 82 MMHG | SYSTOLIC BLOOD PRESSURE: 131 MMHG | HEART RATE: 76 BPM | OXYGEN SATURATION: 100 % | RESPIRATION RATE: 18 BRPM | TEMPERATURE: 98 F | WEIGHT: 120 LBS | BODY MASS INDEX: 19.29 KG/M2

## 2018-06-28 DIAGNOSIS — L02.411 ABSCESS OF RIGHT AXILLA: Primary | ICD-10-CM

## 2018-06-28 DIAGNOSIS — M79.621 AXILLARY PAIN, RIGHT: ICD-10-CM

## 2018-06-28 PROCEDURE — 99283 EMERGENCY DEPT VISIT LOW MDM: CPT | Mod: 25

## 2018-06-28 PROCEDURE — 25000003 PHARM REV CODE 250: Performed by: REGISTERED NURSE

## 2018-06-28 PROCEDURE — 10060 I&D ABSCESS SIMPLE/SINGLE: CPT | Mod: RT

## 2018-06-28 RX ORDER — SULFAMETHOXAZOLE AND TRIMETHOPRIM 800; 160 MG/1; MG/1
1 TABLET ORAL 2 TIMES DAILY
Qty: 14 TABLET | Refills: 0 | Status: SHIPPED | OUTPATIENT
Start: 2018-06-28 | End: 2018-07-05

## 2018-06-28 RX ORDER — TRAMADOL HYDROCHLORIDE 50 MG/1
50 TABLET ORAL
Status: COMPLETED | OUTPATIENT
Start: 2018-06-28 | End: 2018-06-28

## 2018-06-28 RX ORDER — LIDOCAINE HYDROCHLORIDE 10 MG/ML
10 INJECTION, SOLUTION EPIDURAL; INFILTRATION; INTRACAUDAL; PERINEURAL
Status: COMPLETED | OUTPATIENT
Start: 2018-06-28 | End: 2018-06-28

## 2018-06-28 RX ORDER — TRAMADOL HYDROCHLORIDE 50 MG/1
50 TABLET ORAL EVERY 6 HOURS PRN
Qty: 12 TABLET | Refills: 0 | Status: SHIPPED | OUTPATIENT
Start: 2018-06-28 | End: 2018-07-08

## 2018-06-28 RX ORDER — SULFAMETHOXAZOLE AND TRIMETHOPRIM 800; 160 MG/1; MG/1
1 TABLET ORAL
Status: COMPLETED | OUTPATIENT
Start: 2018-06-28 | End: 2018-06-28

## 2018-06-28 RX ADMIN — LIDOCAINE HYDROCHLORIDE 100 MG: 10 INJECTION, SOLUTION EPIDURAL; INFILTRATION; INTRACAUDAL; PERINEURAL at 01:06

## 2018-06-28 RX ADMIN — SULFAMETHOXAZOLE AND TRIMETHOPRIM 1 TABLET: 800; 160 TABLET ORAL at 01:06

## 2018-06-28 RX ADMIN — TRAMADOL HYDROCHLORIDE 50 MG: 50 TABLET, FILM COATED ORAL at 01:06

## 2018-06-28 NOTE — ED PROVIDER NOTES
History      Chief Complaint   Patient presents with    Abscess     to right armpit, also c/o pain from abdominal hernia        Review of patient's allergies indicates:  No Known Allergies     HPI   HPI    6/28/2018, 1:32 AM   History obtained from the patient      History of Present Illness: Manuela Dia is a 37 y.o. female patient who presents to the Emergency Department for abscess under R axilla which onset gradually 3 days ago. Symptoms are constant and moderate in severity. No mitigating or exacerbating factors reported. Associated sxs include pain to area. Patient denies any fever, chills, NVD, abdominal pain, CP, SOB, and all other sxs at this time. No prior Tx. No further complaints or concerns at this time.         Arrival mode: Personal vehicle     PCP: Primary Doctor No       Past Medical History:  Past Medical History:   Diagnosis Date    Anxiety     Bipolar 1 disorder     Chronic back pain     Depression     Spine degeneration        Past Surgical History:  Past Surgical History:   Procedure Laterality Date    HYSTERECTOMY           Family History:  Family History   Problem Relation Age of Onset    Scleroderma Mother     Pulmonary fibrosis Mother     Diabetes Mother     Hypertension Mother     Hyperlipidemia Mother     Cancer Mother     COPD Mother        Social History:  Social History     Social History Main Topics    Smoking status: Current Every Day Smoker     Packs/day: 0.50     Types: Cigarettes    Smokeless tobacco: Never Used    Alcohol use No    Drug use: No    Sexual activity: Yes     Partners: Male     Birth control/ protection: See Surgical Hx       ROS   Review of Systems   Constitutional: Negative for fever.   HENT: Negative for sore throat.    Respiratory: Negative for shortness of breath.    Cardiovascular: Negative for chest pain.   Gastrointestinal: Negative for nausea.   Genitourinary: Negative for dysuria.   Musculoskeletal: Negative for back pain.   Skin:  Negative for rash.        + abscess R axilla    Neurological: Negative for weakness.   Hematological: Does not bruise/bleed easily.   All other systems reviewed and are negative.      Physical Exam      Initial Vitals [06/28/18 0127]   BP Pulse Resp Temp SpO2   131/82 76 18 98 °F (36.7 °C) 100 %      MAP       --          Physical Exam  Nursing Notes and Vital Signs Reviewed.  Constitutional: Patient is in no acute distress. Well-developed and well-nourished.  Head: Atraumatic. Normocephalic.  Eyes: PERRL. EOM intact. Conjunctivae are not pale. No scleral icterus.  ENT: Mucous membranes are moist. Oropharynx is clear and symmetric.    Neck: Supple. Full ROM. No lymphadenopathy.  Cardiovascular: Regular rate. Regular rhythm. No murmurs, rubs, or gallops. Distal pulses are 2+ and symmetric.  Pulmonary/Chest: No respiratory distress. Clear to auscultation bilaterally. No wheezing or rales.  Abdominal: Soft and non-distended.  There is no tenderness.  No rebound, guarding, or rigidity. Good bowel sounds.  Genitourinary: No CVA tenderness  Musculoskeletal: Moves all extremities. No obvious deformities. No edema. No calf tenderness.  Skin: Warm and dry. 2 cm area of erythema and induration to R axilla with fluctuance, tender to palpation  Neurological:  Alert, awake, and appropriate.  Normal speech.  No acute focal neurological deficits are appreciated.  Psychiatric: Normal affect. Good eye contact. Appropriate in content.    ED Course    I & D - Incision and Drainage  Date/Time: 6/28/2018 1:44 AM  Performed by: RADHA RIVERO JR  Authorized by: RADHA RIVERO JR   Type: abscess  Body area: upper extremity (R axilla )  Anesthesia: local infiltration    Anesthesia:  Local Anesthetic: lidocaine 1% without epinephrine  Scalpel size: 11  Incision type: single straight  Complexity: simple  Drainage: pus  Drainage amount: moderate  Wound treatment: incision  Packing material: 1/4 in gauze  Patient tolerance: Patient  "tolerated the procedure well with no immediate complications        ED Vital Signs:  Vitals:    06/28/18 0126 06/28/18 0127   BP:  131/82   Pulse:  76   Resp:  18   Temp:  98 °F (36.7 °C)   TempSrc: Oral Oral   SpO2:  100%   Weight:  54.4 kg (120 lb)   Height:  5' 6" (1.676 m)       Abnormal Lab Results:  Labs Reviewed - No data to display     All Lab Results:      Imaging Results:  Imaging Results    None                 The Emergency Provider reviewed the vital signs and test results, which are outlined above.    ED Discussion     1:46 AM: Reassessed pt at this time.  Pt states her condition has improved at this time. Discussed with pt all pertinent ED information and results. Discussed pt dx and plan of tx. Gave pt all f/u and return to the ED instructions. All questions and concerns were addressed at this time. Pt expresses understanding of information and instructions, and is comfortable with plan to discharge. Pt is stable for discharge.        ED Medication(s):  Medications   sulfamethoxazole-trimethoprim 800-160mg per tablet 1 tablet (not administered)   traMADol tablet 50 mg (not administered)   lidocaine (PF) 10 mg/ml (1%) injection 100 mg (100 mg Infiltration Given 6/28/18 0139)       New Prescriptions    SULFAMETHOXAZOLE-TRIMETHOPRIM 800-160MG (BACTRIM DS) 800-160 MG TAB    Take 1 tablet by mouth 2 (two) times daily. for 7 days    TRAMADOL (ULTRAM) 50 MG TABLET    Take 1 tablet (50 mg total) by mouth every 6 (six) hours as needed.       Follow-up Information     Primary Doctor No In 1 week.    Why:  As needed           [facility]    Specialty:  Emergency Medicine  Why:  If symptoms worsen  Contact information:  [address]  [facility] [address] [phone]                   Medical Decision Making                Clinical Impression       ICD-10-CM ICD-9-CM   1. Abscess of right axilla L02.411 682.3   2. Axillary pain, right M79.621 729.5               Ted " JOSESITO Richardson Jr., [facility]  07/02/18 2045

## 2018-07-15 ENCOUNTER — HOSPITAL ENCOUNTER (EMERGENCY)
Facility: HOSPITAL | Age: 38
Discharge: HOME | End: 2018-07-15
Payer: MEDICAID

## 2018-07-15 VITALS
HEART RATE: 74 BPM | RESPIRATION RATE: 20 BRPM | HEIGHT: 66 IN | WEIGHT: 120 LBS | DIASTOLIC BLOOD PRESSURE: 73 MMHG | TEMPERATURE: 99 F | SYSTOLIC BLOOD PRESSURE: 118 MMHG | OXYGEN SATURATION: 98 % | BODY MASS INDEX: 19.29 KG/M2

## 2018-07-15 DIAGNOSIS — R10.30 LOWER ABDOMINAL PAIN: Primary | ICD-10-CM

## 2018-07-15 LAB
ALBUMIN SERPL BCP-MCNC: 3.4 G/DL
ALP SERPL-CCNC: 46 U/L
ALT SERPL W/O P-5'-P-CCNC: 16 U/L
ANION GAP SERPL CALC-SCNC: 7 MMOL/L
AST SERPL-CCNC: 11 U/L
B-HCG UR QL: NEGATIVE
BASOPHILS # BLD AUTO: 0.02 K/UL
BASOPHILS NFR BLD: 0.2 %
BILIRUB SERPL-MCNC: 0.5 MG/DL
BILIRUB UR QL STRIP: NEGATIVE
BUN SERPL-MCNC: 9 MG/DL
CALCIUM SERPL-MCNC: 8.6 MG/DL
CHLORIDE SERPL-SCNC: 110 MMOL/L
CLARITY UR: CLEAR
CO2 SERPL-SCNC: 22 MMOL/L
COLOR UR: YELLOW
CREAT SERPL-MCNC: 0.7 MG/DL
DACRYOCYTES BLD QL SMEAR: NORMAL
DIFFERENTIAL METHOD: NORMAL
EOSINOPHIL # BLD AUTO: 0.1 K/UL
EOSINOPHIL NFR BLD: 1.2 %
ERYTHROCYTE [DISTWIDTH] IN BLOOD BY AUTOMATED COUNT: 13.7 %
EST. GFR  (AFRICAN AMERICAN): >60 ML/MIN/1.73 M^2
EST. GFR  (NON AFRICAN AMERICAN): >60 ML/MIN/1.73 M^2
GLUCOSE SERPL-MCNC: 89 MG/DL
GLUCOSE UR QL STRIP: NEGATIVE
HCT VFR BLD AUTO: 40.8 %
HGB BLD-MCNC: 13.7 G/DL
HGB UR QL STRIP: NEGATIVE
KETONES UR QL STRIP: NEGATIVE
LEUKOCYTE ESTERASE UR QL STRIP: NEGATIVE
LYMPHOCYTES # BLD AUTO: 2.6 K/UL
LYMPHOCYTES NFR BLD: 30.9 %
MCH RBC QN AUTO: 30.6 PG
MCHC RBC AUTO-ENTMCNC: 33.6 G/DL
MCV RBC AUTO: 91 FL
MONOCYTES # BLD AUTO: 0.6 K/UL
MONOCYTES NFR BLD: 7.1 %
NEUTROPHILS # BLD AUTO: 5.1 K/UL
NEUTROPHILS NFR BLD: 60.8 %
NITRITE UR QL STRIP: NEGATIVE
PH UR STRIP: 8 [PH] (ref 5–8)
PLATELET # BLD AUTO: 182 K/UL
PLATELET BLD QL SMEAR: NORMAL
PMV BLD AUTO: 11 FL
POTASSIUM SERPL-SCNC: 4.2 MMOL/L
PROT SERPL-MCNC: 6.3 G/DL
PROT UR QL STRIP: NEGATIVE
RBC # BLD AUTO: 4.48 M/UL
SODIUM SERPL-SCNC: 139 MMOL/L
SP GR UR STRIP: 1.01 (ref 1–1.03)
SPHEROCYTES BLD QL SMEAR: NORMAL
STOMATOCYTES BLD QL SMEAR: PRESENT
URN SPEC COLLECT METH UR: NORMAL
UROBILINOGEN UR STRIP-ACNC: NEGATIVE EU/DL
WBC # BLD AUTO: 8.35 K/UL

## 2018-07-15 PROCEDURE — 85025 COMPLETE CBC W/AUTO DIFF WBC: CPT

## 2018-07-15 PROCEDURE — 51702 INSERT TEMP BLADDER CATH: CPT | Mod: 59

## 2018-07-15 PROCEDURE — 36415 COLL VENOUS BLD VENIPUNCTURE: CPT

## 2018-07-15 PROCEDURE — 80053 COMPREHEN METABOLIC PANEL: CPT

## 2018-07-15 PROCEDURE — 25500020 PHARM REV CODE 255: Performed by: REGISTERED NURSE

## 2018-07-15 PROCEDURE — 96376 TX/PRO/DX INJ SAME DRUG ADON: CPT

## 2018-07-15 PROCEDURE — 96375 TX/PRO/DX INJ NEW DRUG ADDON: CPT | Mod: 59

## 2018-07-15 PROCEDURE — 99285 EMERGENCY DEPT VISIT HI MDM: CPT | Mod: 25

## 2018-07-15 PROCEDURE — 63600175 PHARM REV CODE 636 W HCPCS: Performed by: REGISTERED NURSE

## 2018-07-15 PROCEDURE — 81025 URINE PREGNANCY TEST: CPT

## 2018-07-15 PROCEDURE — 96374 THER/PROPH/DIAG INJ IV PUSH: CPT | Mod: 59

## 2018-07-15 PROCEDURE — 81003 URINALYSIS AUTO W/O SCOPE: CPT

## 2018-07-15 RX ORDER — MORPHINE SULFATE 4 MG/ML
2 INJECTION, SOLUTION INTRAMUSCULAR; INTRAVENOUS
Status: DISCONTINUED | OUTPATIENT
Start: 2018-07-15 | End: 2018-07-15

## 2018-07-15 RX ORDER — ACETAMINOPHEN AND CODEINE PHOSPHATE 300; 30 MG/1; MG/1
1 TABLET ORAL EVERY 6 HOURS PRN
Qty: 12 TABLET | Refills: 0 | Status: SHIPPED | OUTPATIENT
Start: 2018-07-15 | End: 2018-07-25

## 2018-07-15 RX ORDER — MORPHINE SULFATE 4 MG/ML
4 INJECTION, SOLUTION INTRAMUSCULAR; INTRAVENOUS
Status: COMPLETED | OUTPATIENT
Start: 2018-07-15 | End: 2018-07-15

## 2018-07-15 RX ORDER — ONDANSETRON 2 MG/ML
4 INJECTION INTRAMUSCULAR; INTRAVENOUS
Status: COMPLETED | OUTPATIENT
Start: 2018-07-15 | End: 2018-07-15

## 2018-07-15 RX ORDER — MORPHINE SULFATE 4 MG/ML
2 INJECTION, SOLUTION INTRAMUSCULAR; INTRAVENOUS
Status: COMPLETED | OUTPATIENT
Start: 2018-07-15 | End: 2018-07-15

## 2018-07-15 RX ORDER — TRAMADOL HYDROCHLORIDE 50 MG/1
50 TABLET ORAL EVERY 6 HOURS PRN
Qty: 12 TABLET | Refills: 0 | Status: SHIPPED | OUTPATIENT
Start: 2018-07-15 | End: 2018-07-15 | Stop reason: CLARIF

## 2018-07-15 RX ORDER — IBUPROFEN 600 MG/1
600 TABLET ORAL EVERY 6 HOURS PRN
Qty: 20 TABLET | Refills: 0 | Status: SHIPPED | OUTPATIENT
Start: 2018-07-15 | End: 2018-09-12 | Stop reason: CLARIF

## 2018-07-15 RX ADMIN — IOHEXOL 75 ML: 350 INJECTION, SOLUTION INTRAVENOUS at 03:07

## 2018-07-15 RX ADMIN — ONDANSETRON 4 MG: 2 INJECTION, SOLUTION INTRAMUSCULAR; INTRAVENOUS at 12:07

## 2018-07-15 RX ADMIN — MORPHINE SULFATE 2 MG: 4 INJECTION INTRAVENOUS at 12:07

## 2018-07-15 RX ADMIN — MORPHINE SULFATE 4 MG: 4 INJECTION INTRAVENOUS at 03:07

## 2018-07-15 NOTE — ED PROVIDER NOTES
SCRIBE #1 NOTE: I, Ming Bowens, am scribing for, and in the presence of,  Ted Richardson Jr., RD. I have scribed the entire note.       History      Chief Complaint   Patient presents with    Abdominal Pain     onset during sex       Review of patient's allergies indicates:  No Known Allergies     HPI   HPI    7/15/2018, 12:34 PM   History obtained from the patient      History of Present Illness: Manuela Dia is a 37 y.o. female patient who presents to the Emergency Department for lower abdominal pain that began just PTA after having intercourse. Pt denies any NVD, CP, SOB, vaginal bleeding, dysuria, hematuria, vaginal discharge or any other symptoms at this time.       Arrival mode: Personal vehicle     PCP: Primary Doctor No       Past Medical History:  Past Medical History:   Diagnosis Date    Anxiety     Bipolar 1 disorder     Chronic back pain     Depression     Spine degeneration        Past Surgical History:  Past Surgical History:   Procedure Laterality Date    HYSTERECTOMY           Family History:  Family History   Problem Relation Age of Onset    Scleroderma Mother     Pulmonary fibrosis Mother     Diabetes Mother     Hypertension Mother     Hyperlipidemia Mother     Cancer Mother     COPD Mother        Social History:  Social History     Social History Main Topics    Smoking status: Current Every Day Smoker     Packs/day: 0.50     Types: Cigarettes    Smokeless tobacco: Never Used    Alcohol use No    Drug use: No    Sexual activity: Yes     Partners: Male     Birth control/ protection: See Surgical Hx       ROS   Review of Systems   Constitutional: Negative for fever.   HENT: Negative for sore throat.    Respiratory: Negative for shortness of breath.    Cardiovascular: Negative for chest pain.   Gastrointestinal: Positive for abdominal pain. Negative for nausea.   Genitourinary: Negative for dysuria.   Musculoskeletal: Negative for back pain.   Skin: Negative for rash.  "  Neurological: Negative for weakness.   Hematological: Does not bruise/bleed easily.   All other systems reviewed and are negative.      Physical Exam      Initial Vitals [07/15/18 1205]   BP Pulse Resp Temp SpO2   110/76 62 18 98.5 °F (36.9 °C) 100 %      MAP       --          Physical Exam  Nursing Notes and Vital Signs Reviewed.  Constitutional: Patient is in no acute distress. Well-developed and well-nourished.  Head: Atraumatic. Normocephalic.  Eyes: PERRL. EOM intact. Conjunctivae are not pale. No scleral icterus.  ENT: Mucous membranes are moist. Oropharynx is clear and symmetric.    Neck: Supple. Full ROM. No lymphadenopathy.  Cardiovascular: Regular rate. Regular rhythm. No murmurs, rubs, or gallops. Distal pulses are 2+ and symmetric.  Pulmonary/Chest: No respiratory distress. Clear to auscultation bilaterally. No wheezing or rales.  Abdominal: Soft and non-distended.  There is generalized lower abdominal tenderness.  No rebound, guarding, or rigidity.   Pelvic: A female chaperone was present for this examination. Nl external inspection. No lesions or abnormalities were visible on the labia majora or minora. She has a cuff.  There is no CMT. There is no blood in the vaginal vault. No discharge. No adnexal tenderness. No adnexal masses.  Musculoskeletal: Moves all extremities. No obvious deformities. No edema. No calf tenderness.  Skin: Warm and dry.  Neurological:  Alert, awake, and appropriate.  Normal speech.  No acute focal neurological deficits are appreciated.  Psychiatric: Normal affect. Good eye contact. Appropriate in content.    ED Course    Procedures  ED Vital Signs:  Vitals:    07/15/18 1205 07/15/18 1254 07/15/18 1322 07/15/18 1548   BP: 110/76  92/65 102/73   Pulse: 62  78 70   Resp: 18  20 18   Temp: 98.5 °F (36.9 °C)      TempSrc: Oral      SpO2: 100%  99% 99%   Weight:  54.4 kg (120 lb)     Height: 5' 6" (1.676 m)       07/15/18 1637   BP: 118/73   Pulse: 74   Resp: 20   Temp:    TempSrc: "    SpO2: 98%   Weight:    Height:        Abnormal Lab Results:  Labs Reviewed   COMPREHENSIVE METABOLIC PANEL - Abnormal; Notable for the following:        Result Value    CO2 22 (*)     Calcium 8.6 (*)     Albumin 3.4 (*)     Alkaline Phosphatase 46 (*)     Anion Gap 7 (*)     All other components within normal limits   CBC W/ AUTO DIFFERENTIAL   URINALYSIS   PREGNANCY TEST, URINE RAPID        All Lab Results:  Results for orders placed or performed during the hospital encounter of 07/15/18   CBC auto differential   Result Value Ref Range    WBC 8.35 3.90 - 12.70 K/uL    RBC 4.48 4.00 - 5.40 M/uL    Hemoglobin 13.7 12.0 - 16.0 g/dL    Hematocrit 40.8 37.0 - 48.5 %    MCV 91 82 - 98 fL    MCH 30.6 27.0 - 31.0 pg    MCHC 33.6 32.0 - 36.0 g/dL    RDW 13.7 11.5 - 14.5 %    Platelets 182 150 - 350 K/uL    MPV 11.0 9.2 - 12.9 fL    Gran # (ANC) 5.1 1.8 - 7.7 K/uL    Lymph # 2.6 1.0 - 4.8 K/uL    Mono # 0.6 0.3 - 1.0 K/uL    Eos # 0.1 0.0 - 0.5 K/uL    Baso # 0.02 0.00 - 0.20 K/uL    Gran% 60.8 38.0 - 73.0 %    Lymph% 30.9 18.0 - 48.0 %    Mono% 7.1 4.0 - 15.0 %    Eosinophil% 1.2 0.0 - 8.0 %    Basophil% 0.2 0.0 - 1.9 %    Platelet Estimate Appears normal     Tear Drop Cells Occasional     Stomatocytes Present     Spherocytes Occasional     Differential Method Automated    Comprehensive metabolic panel   Result Value Ref Range    Sodium 139 136 - 145 mmol/L    Potassium 4.2 3.5 - 5.1 mmol/L    Chloride 110 95 - 110 mmol/L    CO2 22 (L) 23 - 29 mmol/L    Glucose 89 70 - 110 mg/dL    BUN, Bld 9 6 - 20 mg/dL    Creatinine 0.7 0.5 - 1.4 mg/dL    Calcium 8.6 (L) 8.7 - 10.5 mg/dL    Total Protein 6.3 6.0 - 8.4 g/dL    Albumin 3.4 (L) 3.5 - 5.2 g/dL    Total Bilirubin 0.5 0.1 - 1.0 mg/dL    Alkaline Phosphatase 46 (L) 55 - 135 U/L    AST 11 10 - 40 U/L    ALT 16 10 - 44 U/L    Anion Gap 7 (L) 8 - 16 mmol/L    eGFR if African American >60 >60 mL/min/1.73 m^2    eGFR if non African American >60 >60 mL/min/1.73 m^2   Urinalysis  Clean Catch   Result Value Ref Range    Specimen UA Urine, Clean Catch     Color, UA Yellow Yellow, Straw, Jinny    Appearance, UA Clear Clear    pH, UA 8.0 5.0 - 8.0    Specific Gravity, UA 1.015 1.005 - 1.030    Protein, UA Negative Negative    Glucose, UA Negative Negative    Ketones, UA Negative Negative    Bilirubin (UA) Negative Negative    Occult Blood UA Negative Negative    Nitrite, UA Negative Negative    Urobilinogen, UA Negative <2.0 EU/dL    Leukocytes, UA Negative Negative   Rapid Pregnancy, Urine   Result Value Ref Range    Preg Test, Ur Negative          Imaging Results:  Imaging Results          CT Abdomen Pelvis With Contrast (Final result)  Result time 07/15/18 16:03:47    Final result by Behzad Valenzuela MD (07/15/18 16:03:47)                 Impression:      No acute findings.    All CT scans at this facility use dose modulation, iterative reconstruction and/or weight based dosing when appropriate to reduce radiation dose to as low as reasonably achievable.      Electronically signed by: Behzad Valenzuela MD  Date:    07/15/2018  Time:    16:03             Narrative:    EXAMINATION:  CT ABDOMEN PELVIS WITH CONTRAST    CLINICAL HISTORY:  lower abdominal pain;    TECHNIQUE:  Procedure performed with 75ml Omnipaque 350.    COMPARISON:  None    FINDINGS:  CT scan of the Abdomen with contrast:    No abnormality of the liver, spleen, or pancreas is seen.    No abnormality of the gallbladder is seen.    Tiny bilateral renal cysts.    No evidence of acute inflammatory process or free fluid.  The appendix is normal.  Small amount of air in the bladder, presumably iatrogenic.  Evidence of hysterectomy.    Lung bases are clear.                               US Pelvis Comp with Transvag NON-OB (xpd (Final result)  Result time 07/15/18 15:23:12    Final result by Behzad Valenzuela MD (07/15/18 15:23:12)                 Impression:      Unremarkable pelvic ultrasound.  Prior hysterectomy.      Electronically  signed by: Behzad Valenzuela MD  Date:    07/15/2018  Time:    15:23             Narrative:    EXAMINATION:  US PELVIS COMP WITH TRANSVAG NON-OB (XPD)    CLINICAL HISTORY:  lower abdominal pain;    FINDINGS:  Prior hysterectomy.  The right ovary and left ovary are normal in appearance with normal blood flow.  Trace physiologic cul-de-sac fluid.                                        The Emergency Provider reviewed the vital signs and test results, which are outlined above.    ED Discussion       4:27 PM: Reassessed pt at this time.  Pt states her condition has improved at this time. Discussed with pt all pertinent ED information and results. Discussed pt dx and plan of tx. Gave pt all f/u and return to the ED instructions. All questions and concerns were addressed at this time. Pt expresses understanding of information and instructions, and is comfortable with plan to discharge. Pt is stable for discharge.    I discussed with patient and/or family/caretaker that evaluation in the ED does not suggest any emergent or life threatening medical conditions requiring immediate intervention beyond what was provided in the ED, and I believe patient is safe for discharge.  Regardless, an unremarkable evaluation in the ED does not preclude the development or presence of a serious of life threatening condition. As such, patient was instructed to return immediately for any worsening or change in current symptoms.      ED Medication(s):  Medications   ondansetron injection 4 mg (4 mg Intravenous Given 7/15/18 1252)   morphine injection 2 mg (2 mg Intravenous Given 7/15/18 1252)   morphine injection 4 mg (4 mg Intravenous Given 7/15/18 1519)   omnipaque 350 iohexol 75 mL (75 mLs Intravenous Given 7/15/18 1540)       Discharge Medication List as of 7/15/2018  4:27 PM      START taking these medications    Details   traMADol (ULTRAM) 50 mg tablet Take 1 tablet (50 mg total) by mouth every 6 (six) hours as needed., Starting Sun  7/15/2018, Until Wed 7/25/2018, Print             Follow-up Information     Primary Doctor No In 3 days.           [facility]    Specialty:  Emergency Medicine  Why:  If symptoms worsen  Contact information:  [address]  [facility] [address] [phone]                   Medical Decision Making    Medical Decision Making:   Clinical Tests:   Lab Tests: Ordered and Reviewed  Radiological Study: Ordered and Reviewed               Attending Attestation:           Physician Attestation for Scribe:  Physician Attestation Statement for Scribe #1: I, Ted Richardson Jr., CHIDIP, reviewed documentation, as scribed by Ming Bowens in my presence, and it is both accurate and complete.             Clinical Impression       ICD-10-CM ICD-9-CM   1. Lower abdominal pain R10.30 789.09       Disposition:   Disposition: Discharged  Condition: Stable         RD Costello Jr.  07/19/18 8899

## 2018-09-12 ENCOUNTER — HOSPITAL ENCOUNTER (EMERGENCY)
Facility: HOSPITAL | Age: 38
Discharge: HOME | End: 2018-09-12
Attending: EMERGENCY MEDICINE
Payer: MEDICAID

## 2018-09-12 VITALS
HEART RATE: 89 BPM | SYSTOLIC BLOOD PRESSURE: 110 MMHG | DIASTOLIC BLOOD PRESSURE: 77 MMHG | HEIGHT: 66 IN | WEIGHT: 116.19 LBS | RESPIRATION RATE: 18 BRPM | OXYGEN SATURATION: 97 % | BODY MASS INDEX: 18.67 KG/M2 | TEMPERATURE: 99 F

## 2018-09-12 DIAGNOSIS — J02.9 SORE THROAT: Primary | ICD-10-CM

## 2018-09-12 DIAGNOSIS — R05.9 COUGH: ICD-10-CM

## 2018-09-12 LAB — DEPRECATED S PYO AG THROAT QL EIA: NEGATIVE

## 2018-09-12 PROCEDURE — 99284 EMERGENCY DEPT VISIT MOD MDM: CPT | Mod: 25

## 2018-09-12 PROCEDURE — 87081 CULTURE SCREEN ONLY: CPT

## 2018-09-12 PROCEDURE — 87880 STREP A ASSAY W/OPTIC: CPT

## 2018-09-12 RX ORDER — AZITHROMYCIN 250 MG/1
250 TABLET, FILM COATED ORAL DAILY
Qty: 6 TABLET | Refills: 0 | Status: SHIPPED | OUTPATIENT
Start: 2018-09-12 | End: 2018-09-22

## 2018-09-12 RX ORDER — NAPROXEN 375 MG/1
375 TABLET ORAL 2 TIMES DAILY WITH MEALS
Qty: 30 TABLET | Refills: 0 | OUTPATIENT
Start: 2018-09-12 | End: 2018-11-05

## 2018-09-12 NOTE — ED PROVIDER NOTES
History      Chief Complaint   Patient presents with    Sore Throat     with cough and gen. body aches       Review of patient's allergies indicates:   Allergen Reactions    Tramadol Nausea And Vomiting and Other (See Comments)     Extreme fatigue, weakness        HPI   HPI    9/12/2018, 9:51 AM   History obtained from the patient      History of Present Illness: Manuela Dia is a 37 y.o. female patient who presents to the Emergency Department for sore throat x 2 weeks.  Associated symptoms include cough, body aches, nasal congestion.  Denies fever, vomiting, diarrhea, chest pain, SOB, headache, dizziness.  No treatments tried.  Patient states that she currently smokes 1 pack of cigarettes per day.        Arrival mode: Personal vehicle      PCP: Primary Doctor No       Past Medical History:  Past Medical History:   Diagnosis Date    Anxiety     Bipolar 1 disorder     Chronic back pain     Depression     Spine degeneration        Past Surgical History:  Past Surgical History:   Procedure Laterality Date    DILATION AND CURETTAGE OF UTERUS      HYSTERECTOMY           Family History:  Family History   Problem Relation Age of Onset    Scleroderma Mother     Pulmonary fibrosis Mother     Diabetes Mother     Hypertension Mother     Hyperlipidemia Mother     Cancer Mother     COPD Mother        Social History:  Social History     Tobacco Use    Smoking status: Current Every Day Smoker     Packs/day: 1.00     Types: Cigarettes    Smokeless tobacco: Never Used   Substance and Sexual Activity    Alcohol use: No    Drug use: Yes     Types: Marijuana    Sexual activity: Yes     Partners: Male     Birth control/protection: See Surgical Hx       ROS   Review of Systems   Constitutional: Negative for chills and fever.   HENT: Positive for congestion, rhinorrhea and sore throat. Negative for ear pain.    Respiratory: Positive for cough. Negative for shortness of breath and wheezing.    Cardiovascular:  "Negative for chest pain and palpitations.   Gastrointestinal: Negative for diarrhea and vomiting.   Genitourinary: Negative for dysuria and frequency.   Musculoskeletal: Negative for back pain and neck pain.   Skin: Negative for rash and wound.   Neurological: Negative for dizziness and headaches.     Physical Exam      Initial Vitals [09/12/18 0936]   BP Pulse Resp Temp SpO2   110/77 89 18 98.7 °F (37.1 °C) 97 %      MAP       --          Physical Exam  Nursing Notes and Vital Signs Reviewed.  Constitutional: Patient is in no apparent distress. Awake and alert. Well-developed and well-nourished.  Head: Atraumatic. Normocephalic.  Eyes: PERRL. EOM intact. Conjunctivae are not pale. No scleral icterus.  ENT: Mucous membranes are moist. Oropharynx is clear and symmetric.  Bilateral TM unremarkable.  Post nasal drip noted.   Neck: Supple. Full ROM. No lymphadenopathy.  Cardiovascular: Regular rate. Regular rhythm. No murmurs, rubs, or gallops. Distal pulses are 2+ and symmetric.  Pulmonary/Chest: No respiratory distress. Clear to auscultation bilaterally. No wheezing, rales, or rhonchi.  Abdominal: Soft and non-distended.  There is no tenderness.  No rebound, guarding, or rigidity. Good bowel sounds.  Cough noted.   Genitourinary: No CVA tenderness  Musculoskeletal: Moves all extremities. No obvious deformities. No edema. No calf tenderness.  Skin: Warm and dry.  Neurological:  Alert, awake, and appropriate.  Normal speech.  No acute focal neurological deficits are appreciated.  Psychiatric: Normal affect. Good eye contact. Appropriate in content.    ED Course    Procedures  ED Vital Signs:  Vitals:    09/12/18 0936   BP: 110/77   Pulse: 89   Resp: 18   Temp: 98.7 °F (37.1 °C)   TempSrc: Oral   SpO2: 97%   Weight: 52.7 kg (116 lb 2.9 oz)   Height: 5' 6" (1.676 m)       Abnormal Lab Results:  Labs Reviewed   THROAT SCREEN, RAPID   CULTURE, STREP A,  THROAT        All Lab Results:  Results for orders placed or performed " during the hospital encounter of 09/12/18   Rapid strep screen   Result Value Ref Range    Rapid Strep A Screen Negative Negative       Imaging Results:  Imaging Results          X-Ray Chest PA And Lateral (Final result)  Result time 09/12/18 10:18:24    Final result by FENG Dempsey Sr., MD (09/12/18 10:18:24)                 Impression:      Normal study.      Electronically signed by: Behzad Dempsey MD  Date:    09/12/2018  Time:    10:18             Narrative:    EXAMINATION:  XR CHEST PA AND LATERAL    CLINICAL HISTORY:  Cough    COMPARISON:  08/05/2016    FINDINGS:  The size and contour of the heart are normal. The lungs are clear. There is no pneumothorax or pleural effusion.                                        The Emergency Provider reviewed the vital signs and test results, which are outlined above.    ED Discussion     11:43 AM: DIALLO Bass transfers care of pt to Dr. Davis.    11:45 AM: Re-evaluated pt. Agree with DIALLO Bass's initial assessment.    12:04 PM: Reassessed pt at this time.  Pt states her condition has  at this time. Discussed with pt all pertinent ED information and results. Discussed pt dx and plan of tx. Gave pt all f/u and return to the ED instructions. All questions and concerns were addressed at this time. Pt expresses understanding of information and instructions, and is comfortable with plan to discharge. Pt is stable for discharge.    I discussed with patient and/or family/caretaker that evaluation in the ED does not suggest any emergent or life threatening medical conditions requiring immediate intervention beyond what was provided in the ED, and I believe patient is safe for discharge.  Regardless, an unremarkable evaluation in the ED does not preclude the development or presence of a serious of life threatening condition. As such, patient was instructed to return immediately for any worsening or change in current symptoms.    ED Medication(s):  Medications -  No data to display    This SmartLink is deprecated. Use [employer] SystemsEDLIST instead to display the medication list for a patient.    Follow-up Information     [facility] In 2 days.    Contact information:  [address] [facility] [address] [phone]                     Medical Decision Making    Medical Decision Making:   Clinical Tests:   Lab Tests: Reviewed and Ordered  Radiological Study: Reviewed and Ordered               Clinical Impression       ICD-10-CM ICD-9-CM   1. Sore throat J02.9 462   2. Cough R05 786.2       Disposition:   Disposition: Discharged  Condition: Stable           Cuong Davis MD  09/12/18 1216

## 2018-09-14 LAB — BACTERIA THROAT CULT: NORMAL

## 2018-10-25 ENCOUNTER — HOSPITAL ENCOUNTER (EMERGENCY)
Facility: HOSPITAL | Age: 38
Discharge: HOME | End: 2018-10-25
Attending: EMERGENCY MEDICINE
Payer: MEDICAID

## 2018-10-25 VITALS
RESPIRATION RATE: 20 BRPM | DIASTOLIC BLOOD PRESSURE: 68 MMHG | TEMPERATURE: 98 F | OXYGEN SATURATION: 96 % | HEIGHT: 66 IN | WEIGHT: 116.88 LBS | BODY MASS INDEX: 18.79 KG/M2 | HEART RATE: 93 BPM | SYSTOLIC BLOOD PRESSURE: 107 MMHG
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
## 2018-10-25 DIAGNOSIS — S13.9XXA CERVICAL SPRAIN, INITIAL ENCOUNTER: ICD-10-CM

## 2018-10-25 PROCEDURE — 25000003 PHARM REV CODE 250: Performed by: PHYSICIAN ASSISTANT

## 2018-10-25 PROCEDURE — 99284 EMERGENCY DEPT VISIT MOD MDM: CPT

## 2018-10-25 RX ORDER — IBUPROFEN 800 MG/1
800 TABLET ORAL
Status: COMPLETED | OUTPATIENT
Start: 2018-10-25 | End: 2018-10-25

## 2018-10-25 RX ORDER — ETODOLAC 300 MG/1
300 CAPSULE ORAL 3 TIMES DAILY
Qty: 30 CAPSULE | Refills: 0 | OUTPATIENT
Start: 2018-10-25 | End: 2018-11-05

## 2018-10-25 RX ORDER — ORPHENADRINE CITRATE 100 MG/1
100 TABLET, EXTENDED RELEASE ORAL 2 TIMES DAILY
Qty: 20 TABLET | Refills: 0 | Status: SHIPPED | OUTPATIENT
Start: 2018-10-25 | End: 2018-11-04

## 2018-10-25 RX ORDER — LORAZEPAM 1 MG/1
1 TABLET ORAL
Status: COMPLETED | OUTPATIENT
Start: 2018-10-25 | End: 2018-10-25

## 2018-10-25 RX ADMIN — LORAZEPAM 1 MG: 1 TABLET ORAL at 12:10

## 2018-10-25 RX ADMIN — IBUPROFEN 800 MG: 800 TABLET ORAL at 01:10
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
## 2018-11-04 ENCOUNTER — HOSPITAL ENCOUNTER (EMERGENCY)
Facility: HOSPITAL | Age: 38
Discharge: HOME | End: 2018-11-05
Attending: EMERGENCY MEDICINE
Payer: MEDICAID

## 2018-11-04 VITALS
RESPIRATION RATE: 18 BRPM | BODY MASS INDEX: 19.35 KG/M2 | TEMPERATURE: 98 F | DIASTOLIC BLOOD PRESSURE: 76 MMHG | OXYGEN SATURATION: 99 % | WEIGHT: 120.38 LBS | HEART RATE: 73 BPM | HEIGHT: 66 IN | SYSTOLIC BLOOD PRESSURE: 107 MMHG

## 2018-11-04 DIAGNOSIS — M54.9 BACK PAIN, UNSPECIFIED BACK LOCATION, UNSPECIFIED BACK PAIN LATERALITY, UNSPECIFIED CHRONICITY: Primary | ICD-10-CM

## 2018-11-04 DIAGNOSIS — M54.2 NECK PAIN: ICD-10-CM

## 2018-11-04 PROCEDURE — 99283 EMERGENCY DEPT VISIT LOW MDM: CPT

## 2018-11-05 RX ORDER — IBUPROFEN 600 MG/1
600 TABLET ORAL EVERY 6 HOURS PRN
Qty: 12 TABLET | Refills: 0 | Status: SHIPPED | OUTPATIENT
Start: 2018-11-05 | End: 2019-04-27
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
## 2019-04-25 ENCOUNTER — HOSPITAL ENCOUNTER (EMERGENCY)
Facility: HOSPITAL | Age: 39
Discharge: HOME | End: 2019-04-25
Attending: FAMILY MEDICINE
Payer: MEDICAID

## 2019-04-25 VITALS
SYSTOLIC BLOOD PRESSURE: 115 MMHG | DIASTOLIC BLOOD PRESSURE: 83 MMHG | OXYGEN SATURATION: 97 % | TEMPERATURE: 99 F | HEIGHT: 66 IN | RESPIRATION RATE: 18 BRPM | BODY MASS INDEX: 19.77 KG/M2 | WEIGHT: 123 LBS | HEART RATE: 87 BPM

## 2019-04-25 DIAGNOSIS — S61.401A AVULSION OF SKIN OF RIGHT HAND, INITIAL ENCOUNTER: Primary | ICD-10-CM

## 2019-04-25 PROCEDURE — 86703 HIV-1/HIV-2 1 RESULT ANTBDY: CPT

## 2019-04-25 PROCEDURE — 99283 EMERGENCY DEPT VISIT LOW MDM: CPT

## 2019-04-25 PROCEDURE — 25000003 PHARM REV CODE 250: Performed by: REGISTERED NURSE

## 2019-04-25 RX ORDER — SILVER NITRATE 38.21; 12.74 MG/1; MG/1
1 STICK TOPICAL
Status: COMPLETED | OUTPATIENT
Start: 2019-04-25 | End: 2019-04-25

## 2019-04-25 RX ADMIN — Medication: at 04:04

## 2019-04-25 RX ADMIN — SILVER NITRATE APPLICATORS 1 APPLICATOR: 25; 75 STICK TOPICAL at 04:04

## 2019-04-25 NOTE — ED NOTES
Patient was washing dishes and cut her right first knuckle on a broken glass cup.     Level of Consciousness: The patient is awake, alert, and oriented to person, place and time.  Appearance: Sitting in bed with no acute distress noted. Clothing and hygiene are clean and worn appropriately.  Skin: Skin is intact; color consistent with ethnicity.    HEENT: WNL  Musculoskeletal: Moves all extremities well in full range of motion. No obvious deformities or swelling noted.  Respiratory: Airway open and patent, respirations spontaneous, even and unlabored. No accessory muscles in use.   Cardiac: Regular rate, no peripheral edema noted.  Abdomen:  No distention noted. No tenderness noted   Neurologic: PERRLA, face exhibits symmetrical expression, reports normal sensation to all extremities and face.    Patient verbalized understanding of status and plan of care.

## 2019-04-25 NOTE — ED PROVIDER NOTES
SCRIBE #1 NOTE: I, Ani Waggoner, am scribing for, and in the presence of, Ted Richardson Jr., RD. I have scribed the entire note.       History     Chief Complaint   Patient presents with    Hand Injury     pt cut 2nd knuckle or R hand while washing dishes on broken glass     Review of patient's allergies indicates:   Allergen Reactions    Penicillins     Tramadol Nausea And Vomiting and Other (See Comments)     Extreme fatigue, weakness  Extreme fatigue, weakness         History of Present Illness     HPI    4/25/2019, 4:01 PM  History obtained from the patient      History of Present Illness: Manuela Dia is a 38 y.o. female patient with a PMHx of anxiety and depression who presents to the Emergency Department for evaluation of a cut on the R hand which onset suddenly about 30 minutes PTA. Pt was washing dishes and cut her hand on a broken glass cup. Symptoms are constant and moderate in severity. No mitigating or exacerbating factors reported. No associated sxs reported. Patient denies any fever, chills, N/V, dizziness, numbness to R hand, and all other sxs at this time. No prior tx reported. No further complaints or concerns at this time.     Arrival mode: Personal vehicle     PCP: Primary Doctor No        Past Medical History:  Past Medical History:   Diagnosis Date    Anxiety     Bipolar 1 disorder     Chronic back pain     Depression     Spine degeneration        Past Surgical History:  Past Surgical History:   Procedure Laterality Date    DILATION AND CURETTAGE OF UTERUS      HYSTERECTOMY           Family History:  Family History   Problem Relation Age of Onset    Scleroderma Mother     Pulmonary fibrosis Mother     Diabetes Mother     Hypertension Mother     Hyperlipidemia Mother     Cancer Mother     COPD Mother        Social History:  Social History     Tobacco Use    Smoking status: Current Every Day Smoker     Packs/day: 1.00     Types: Cigarettes    Smokeless tobacco: Never Used    Substance and Sexual Activity    Alcohol use: No    Drug use: Yes     Types: Marijuana    Sexual activity: Yes     Partners: Male     Birth control/protection: See Surgical Hx        Review of Systems     Review of Systems   Constitutional: Negative for chills and fever.        (-) numbness to R hand   HENT: Negative for congestion and sore throat.    Respiratory: Negative for shortness of breath.    Cardiovascular: Negative for chest pain.   Gastrointestinal: Negative for nausea and vomiting.   Genitourinary: Negative for dysuria and hematuria.   Musculoskeletal: Negative for back pain.   Skin: Positive for wound (R hand). Negative for rash.   Neurological: Negative for dizziness and weakness.   Psychiatric/Behavioral: Negative for confusion.   All other systems reviewed and are negative.       Physical Exam     Initial Vitals [04/25/19 1549]   BP Pulse Resp Temp SpO2   115/83 87 18 99.1 °F (37.3 °C) 97 %      MAP       --          Physical Exam  Nursing Notes and Vital Signs Reviewed.  Constitutional: Patient is in no apparent distress. Well-developed and well-nourished.  Head: Atraumatic. Normocephalic.  Eyes: PERRL. EOM intact. Conjunctivae are not pale. No scleral icterus.  ENT: Mucous membranes are moist. Oropharynx is clear and symmetric.    Neck: Supple. Full ROM. No lymphadenopathy.  Cardiovascular: Regular rate. Regular rhythm. No murmurs, rubs, or gallops. Distal pulses are 2+ and symmetric.  Pulmonary/Chest: No respiratory distress. Clear to auscultation bilaterally. No wheezing or rales.  Abdominal: Soft and non-distended.  There is no tenderness.  No rebound, guarding, or rigidity. Good bowel sounds.  Musculoskeletal: Moves all extremities. No obvious deformities. No edema. No calf tenderness.  Skin: Warm and dry. 1.5 cm skin avulsion to 2nd MCP dorsum of R hand.  Neurological:  Alert, awake, and appropriate.  Normal speech.  No acute focal neurological deficits are appreciated.  Psychiatric:  "Normal affect. Good eye contact. Appropriate in content.       ED Course   Procedures  ED Vital Signs:  Vitals:    04/25/19 1549   BP: 115/83   Pulse: 87   Resp: 18   Temp: 99.1 °F (37.3 °C)   TempSrc: Oral   SpO2: 97%   Weight: 55.8 kg (123 lb 0.3 oz)   Height: 5' 6" (1.676 m)       Abnormal Lab Results:  Labs Reviewed   HIV 1 / 2 ANTIBODY        All Lab Results:  None resulted.    Imaging Results:  Imaging Results    None              The Emergency Provider reviewed the vital signs and test results, which are outlined above.     ED Discussion     4:40 PM: Used 1 silver nitrate stick on pt to cauterize wound. Pt tolerated well. No bleeding.     4:45 PM: Reassessed pt at this time. Discussed with pt all pertinent ED information and results. Discussed pt dx and plan of tx. Gave pt all f/u and return to the ED instructions. All questions and concerns were addressed at this time. Pt expresses understanding of information and instructions, and is comfortable with plan to discharge. Pt is stable for discharge.      ED Medication(s):  Medications   LETS (lidocaine-tetracaine-epinephrine) gel solution ( Topical (Top) Given 4/25/19 1081)   silver nitrate applicators applicator 1 applicator (1 applicator Topical (Top) Given by Other 4/25/19 1631)       New Prescriptions    No medications on file       Follow-up Information     [facility]    Specialty:  Emergency Medicine  Why:  As needed  Contact information:  [address] [place] [address] [phone]                       Medical Decision Making:   Clinical Tests:   Lab Tests: Ordered             Scribe Attestation:   Scribe #1: I performed the above scribed service and the documentation accurately describes the services I performed. I attest to the accuracy of the note.     Attending:   Physician Attestation Statement for Scribe #1: I, Ted Richardson Jr., CHIDIP, personally performed the services described in this " documentation, as scribed by Ani Waggoner, in my presence, and it is both accurate and complete.           Clinical Impression       ICD-10-CM ICD-9-CM   1. Avulsion of skin of right hand, initial encounter S61.401A 882.0       Disposition:   Disposition: Discharged  Condition: Stable         Ted Richardson Jr., FNP  04/25/19 2147

## 2019-04-26 LAB — HIV 1+2 AB+HIV1 P24 AG SERPL QL IA: NEGATIVE

## 2019-04-27 ENCOUNTER — HOSPITAL ENCOUNTER (EMERGENCY)
Facility: HOSPITAL | Age: 39
Discharge: HOME | End: 2019-04-27
Attending: EMERGENCY MEDICINE
Payer: MEDICAID

## 2019-04-27 VITALS
RESPIRATION RATE: 18 BRPM | DIASTOLIC BLOOD PRESSURE: 90 MMHG | HEIGHT: 66 IN | HEART RATE: 130 BPM | SYSTOLIC BLOOD PRESSURE: 124 MMHG | WEIGHT: 124.13 LBS | OXYGEN SATURATION: 98 % | BODY MASS INDEX: 19.95 KG/M2 | TEMPERATURE: 98 F

## 2019-04-27 DIAGNOSIS — L03.119 CELLULITIS OF HAND: Primary | ICD-10-CM

## 2019-04-27 PROCEDURE — 99284 EMERGENCY DEPT VISIT MOD MDM: CPT

## 2019-04-27 PROCEDURE — 25000003 PHARM REV CODE 250: Performed by: REGISTERED NURSE

## 2019-04-27 RX ORDER — HYDROCODONE BITARTRATE AND ACETAMINOPHEN 10; 325 MG/1; MG/1
1 TABLET ORAL
Status: COMPLETED | OUTPATIENT
Start: 2019-04-27 | End: 2019-04-27

## 2019-04-27 RX ORDER — CLINDAMYCIN HYDROCHLORIDE 150 MG/1
300 CAPSULE ORAL
Status: COMPLETED | OUTPATIENT
Start: 2019-04-27 | End: 2019-04-27

## 2019-04-27 RX ORDER — CLINDAMYCIN HYDROCHLORIDE 300 MG/1
300 CAPSULE ORAL EVERY 6 HOURS
Qty: 28 CAPSULE | Refills: 0 | Status: ON HOLD | OUTPATIENT
Start: 2019-04-27 | End: 2019-04-30 | Stop reason: SDUPTHER

## 2019-04-27 RX ORDER — MUPIROCIN 20 MG/G
OINTMENT TOPICAL 3 TIMES DAILY
Qty: 30 G | Refills: 0 | Status: SHIPPED | OUTPATIENT
Start: 2019-04-27 | End: 2024-01-29

## 2019-04-27 RX ORDER — HYDROCODONE BITARTRATE AND ACETAMINOPHEN 5; 325 MG/1; MG/1
1 TABLET ORAL EVERY 6 HOURS PRN
Qty: 12 TABLET | Refills: 0 | Status: ON HOLD | OUTPATIENT
Start: 2019-04-27 | End: 2019-04-30 | Stop reason: HOSPADM

## 2019-04-27 RX ADMIN — HYDROCODONE BITARTRATE AND ACETAMINOPHEN 1 TABLET: 10; 325 TABLET ORAL at 07:04

## 2019-04-27 RX ADMIN — CLINDAMYCIN HYDROCHLORIDE 300 MG: 150 CAPSULE ORAL at 07:04

## 2019-04-28 PROCEDURE — 96366 THER/PROPH/DIAG IV INF ADDON: CPT

## 2019-04-28 PROCEDURE — 96375 TX/PRO/DX INJ NEW DRUG ADDON: CPT

## 2019-04-28 PROCEDURE — 96365 THER/PROPH/DIAG IV INF INIT: CPT

## 2019-04-28 PROCEDURE — 96367 TX/PROPH/DG ADDL SEQ IV INF: CPT

## 2019-04-28 PROCEDURE — 99285 EMERGENCY DEPT VISIT HI MDM: CPT | Mod: 25

## 2019-04-28 NOTE — ED PROVIDER NOTES
SCRIBE #1 NOTE: I, Shital Vanegas, am scribing for, and in the presence of, Ted Richardson Jr, NP. I have scribed the entire note.      History      Chief Complaint   Patient presents with    Hand Swelling     Pt seen here on 4/25/19 for cut to right hand. Pt reports that the ER cauterized it. Pt denies receiving prescriptions. Pt reporting new onset of swelling.        Review of patient's allergies indicates:   Allergen Reactions    Penicillins     Tramadol Nausea And Vomiting and Other (See Comments)     Extreme fatigue, weakness  Extreme fatigue, weakness        HPI   HPI    4/27/2019, 7:02 PM   History obtained from the patient      History of Present Illness: Manuela Dia is a 38 y.o. female patient who presents to the Emergency Department for evaluation of R hand swelling which onset gradually today. Patient had hand avulsion cauterized on 4/25 in the ED. Symptoms are constant and moderate in severity. No mitigating or exacerbating factors reported. Associated sxs include R hand pain. Patient denies any fever, chills, n/v/d, extremity weakness/numbness, arm pain, SOB, CP, and all other sxs at this time. No further complaints or concerns at this time.         Arrival mode: Personal vehicle    PCP: Primary Doctor No       Past Medical History:  Past Medical History:   Diagnosis Date    Anxiety     Bipolar 1 disorder     Chronic back pain     Depression     Spine degeneration        Past Surgical History:  Past Surgical History:   Procedure Laterality Date    DILATION AND CURETTAGE OF UTERUS      HYSTERECTOMY           Family History:  Family History   Problem Relation Age of Onset    Scleroderma Mother     Pulmonary fibrosis Mother     Diabetes Mother     Hypertension Mother     Hyperlipidemia Mother     Cancer Mother     COPD Mother        Social History:  Social History     Tobacco Use    Smoking status: Current Every Day Smoker     Packs/day: 1.00     Types: Cigarettes    Smokeless  tobacco: Never Used   Substance and Sexual Activity    Alcohol use: No    Drug use: Yes     Types: Marijuana    Sexual activity: Yes     Partners: Male     Birth control/protection: See Surgical Hx       ROS   Review of Systems   Constitutional: Negative for activity change, appetite change, chills, diaphoresis, fatigue and fever.   HENT: Negative for congestion, ear pain, nosebleeds, rhinorrhea, sinus pain, sore throat and trouble swallowing.    Eyes: Negative for pain and discharge.   Respiratory: Negative for cough, chest tightness, shortness of breath, wheezing and stridor.    Cardiovascular: Negative for chest pain, palpitations and leg swelling.   Gastrointestinal: Negative for abdominal distention, abdominal pain, blood in stool, constipation, diarrhea, nausea and vomiting.   Genitourinary: Negative for difficulty urinating, dysuria, flank pain, frequency, hematuria and urgency.   Musculoskeletal: Negative for arthralgias, back pain, myalgias and neck pain.        (+) R hand pain  (+) R hand swelling  (-) arm pain   Skin: Negative for pallor, rash and wound.   Neurological: Negative for dizziness, syncope, weakness, light-headedness, numbness and headaches.   Hematological: Does not bruise/bleed easily.   Psychiatric/Behavioral: Negative for confusion and self-injury.   All other systems reviewed and are negative.      Physical Exam      Initial Vitals [04/27/19 1900]   BP Pulse Resp Temp SpO2   (!) 124/90 (!) 130 18 98.4 °F (36.9 °C) 98 %      MAP       --          Physical Exam  Nursing Notes and Vital Signs Reviewed.  Constitutional: Patient is in no acute distress. Well-developed and well-nourished. Anxious appearing  Head: Atraumatic. Normocephalic.  Eyes: PERRL. EOM intact. Conjunctivae are not pale. No scleral icterus.  ENT: Mucous membranes are moist. Oropharynx is clear and symmetric.    Neck: Supple. Full ROM. No lymphadenopathy.  Cardiovascular: Regular rate. Regular rhythm. No murmurs, rubs, or  "gallops. Distal pulses are 2+ and symmetric.  Pulmonary/Chest: No respiratory distress. Clear to auscultation bilaterally. No wheezing or rales.  Abdominal: Soft and non-distended. There is no tenderness. No rebound, guarding, or rigidity.   Musculoskeletal: Moves all extremities. No obvious deformities. No edema. No calf tenderness.  Skin: Warm and dry.  R hand: Skin avulsion to 2nd MCP with surrounding erythema and fluctuance. TTP to the area        Neurological:  Alert, awake, and appropriate.  Normal speech.  No acute focal neurological deficits are appreciated.  Psychiatric: Normal affect. Good eye contact. Appropriate in content.    ED Course    Procedures  ED Vital Signs:  Vitals:    04/27/19 1900   BP: (!) 124/90   Pulse: (!) 130   Resp: 18   Temp: 98.4 °F (36.9 °C)   TempSrc: Oral   SpO2: 98%   Weight: 56.3 kg (124 lb 1.9 oz)   Height: 5' 6" (1.676 m)       Abnormal Lab Results:  Labs Reviewed - No data to display     All Lab Results:  none    Imaging Results:  Imaging Results    None                 The Emergency Provider reviewed the vital signs and test results, which are outlined above.    ED Discussion     8:17 PM: Discussed with pt all pertinent ED information and results. Discussed pt dx and plan of tx. Gave pt all f/u and return to the ED instructions. All questions and concerns were addressed at this time. Pt expresses understanding of information and instructions, and is comfortable with plan to discharge. Pt is stable for discharge.    I discussed with patient that evaluation in the ED does not suggest any emergent or life threatening medical conditions requiring immediate intervention beyond what was provided in the ED, and I believe patient is safe for discharge.  Regardless, an unremarkable evaluation in the ED does not preclude the development or presence of a serious of life threatening condition. As such, patient was instructed to return immediately for any worsening or change in current " symptoms.      ED Medication(s):  Medications   clindamycin capsule 300 mg (300 mg Oral Given 4/27/19 1924)   HYDROcodone-acetaminophen  mg per tablet 1 tablet (1 tablet Oral Given 4/27/19 1923)       Follow-up Information     [facility]    Specialty:  Emergency Medicine  Why:  If symptoms worsen  Contact information:  [address]  [facility] [address] [phone]                   Current Discharge Medication List      START taking these medications    Details   clindamycin (CLEOCIN) 300 MG capsule Take 1 capsule (300 mg total) by mouth every 6 (six) hours. for 7 days  Qty: 28 capsule, Refills: 0      HYDROcodone-acetaminophen (NORCO) 5-325 mg per tablet Take 1 tablet by mouth every 6 (six) hours as needed.  Qty: 12 tablet, Refills: 0               Medical Decision Making              Scribe Attestation:   Scribe #1: I performed the above scribed service and the documentation accurately describes the services I performed. I attest to the accuracy of the note.    Attending:   Physician Attestation Statement for Scribe #1: I, Ted Richardson Jr, NP, personally performed the services described in this documentation, as scribed by Shital Vanegas, in my presence, and it is both accurate and complete.          Clinical Impression       ICD-10-CM ICD-9-CM   1. Cellulitis of hand L03.119 682.4       Disposition:   Disposition: Discharged  Condition: Stable         Ted Richardson Jr., FNP  04/28/19 1111

## 2019-04-29 ENCOUNTER — HOSPITAL ENCOUNTER (INPATIENT)
Facility: HOSPITAL | Age: 39
LOS: 1 days | Discharge: HOME | DRG: 603 | End: 2019-04-30
Attending: EMERGENCY MEDICINE | Admitting: HOSPITALIST
Payer: MEDICAID

## 2019-04-29 DIAGNOSIS — L03.113 CELLULITIS OF HAND, RIGHT: Primary | ICD-10-CM

## 2019-04-29 DIAGNOSIS — L08.9 INFECTION OF RIGHT HAND: ICD-10-CM

## 2019-04-29 LAB
ALBUMIN SERPL BCP-MCNC: 3.4 G/DL (ref 3.5–5.2)
ALP SERPL-CCNC: 47 U/L (ref 55–135)
ALT SERPL W/O P-5'-P-CCNC: 12 U/L (ref 10–44)
AMPHET+METHAMPHET UR QL: NEGATIVE
ANION GAP SERPL CALC-SCNC: 10 MMOL/L (ref 8–16)
AST SERPL-CCNC: 10 U/L (ref 10–40)
B-HCG UR QL: NEGATIVE
BARBITURATES UR QL SCN>200 NG/ML: NEGATIVE
BASOPHILS # BLD AUTO: 0.02 K/UL (ref 0–0.2)
BASOPHILS NFR BLD: 0.2 % (ref 0–1.9)
BENZODIAZ UR QL SCN>200 NG/ML: NEGATIVE
BILIRUB SERPL-MCNC: 0.4 MG/DL (ref 0.1–1)
BILIRUB UR QL STRIP: NEGATIVE
BUN SERPL-MCNC: 7 MG/DL (ref 6–20)
BZE UR QL SCN: NEGATIVE
CALCIUM SERPL-MCNC: 9 MG/DL (ref 8.7–10.5)
CANNABINOIDS UR QL SCN: NORMAL
CHLORIDE SERPL-SCNC: 108 MMOL/L (ref 95–110)
CLARITY UR: CLEAR
CO2 SERPL-SCNC: 20 MMOL/L (ref 23–29)
COLOR UR: YELLOW
CREAT SERPL-MCNC: 0.7 MG/DL (ref 0.5–1.4)
CREAT UR-MCNC: 89.8 MG/DL (ref 15–325)
CRP SERPL-MCNC: 5.2 MG/L (ref 0–8.2)
DIFFERENTIAL METHOD: ABNORMAL
EOSINOPHIL # BLD AUTO: 0.1 K/UL (ref 0–0.5)
EOSINOPHIL NFR BLD: 1.3 % (ref 0–8)
ERYTHROCYTE [DISTWIDTH] IN BLOOD BY AUTOMATED COUNT: 13.5 % (ref 11.5–14.5)
ERYTHROCYTE [SEDIMENTATION RATE] IN BLOOD BY WESTERGREN METHOD: 3 MM/HR (ref 0–20)
EST. GFR  (AFRICAN AMERICAN): >60 ML/MIN/1.73 M^2
EST. GFR  (NON AFRICAN AMERICAN): >60 ML/MIN/1.73 M^2
GLUCOSE SERPL-MCNC: 107 MG/DL (ref 70–110)
GLUCOSE UR QL STRIP: NEGATIVE
HCT VFR BLD AUTO: 40.6 % (ref 37–48.5)
HGB BLD-MCNC: 14 G/DL (ref 12–16)
HGB UR QL STRIP: NEGATIVE
KETONES UR QL STRIP: NEGATIVE
LEUKOCYTE ESTERASE UR QL STRIP: NEGATIVE
LYMPHOCYTES # BLD AUTO: 3.7 K/UL (ref 1–4.8)
LYMPHOCYTES NFR BLD: 33.6 % (ref 18–48)
MCH RBC QN AUTO: 31.6 PG (ref 27–31)
MCHC RBC AUTO-ENTMCNC: 34.5 G/DL (ref 32–36)
MCV RBC AUTO: 92 FL (ref 82–98)
METHADONE UR QL SCN>300 NG/ML: NEGATIVE
MONOCYTES # BLD AUTO: 0.8 K/UL (ref 0.3–1)
MONOCYTES NFR BLD: 7.2 % (ref 4–15)
NEUTROPHILS # BLD AUTO: 6.4 K/UL (ref 1.8–7.7)
NEUTROPHILS NFR BLD: 57.7 % (ref 38–73)
NITRITE UR QL STRIP: NEGATIVE
OPIATES UR QL SCN: NORMAL
PCP UR QL SCN>25 NG/ML: NEGATIVE
PH UR STRIP: 8 [PH] (ref 5–8)
PLATELET # BLD AUTO: 196 K/UL (ref 150–350)
PMV BLD AUTO: 11.3 FL (ref 9.2–12.9)
POTASSIUM SERPL-SCNC: 3.6 MMOL/L (ref 3.5–5.1)
PROT SERPL-MCNC: 6.4 G/DL (ref 6–8.4)
PROT UR QL STRIP: NEGATIVE
RBC # BLD AUTO: 4.43 M/UL (ref 4–5.4)
SODIUM SERPL-SCNC: 138 MMOL/L (ref 136–145)
SP GR UR STRIP: 1.01 (ref 1–1.03)
TOXICOLOGY INFORMATION: NORMAL
URN SPEC COLLECT METH UR: NORMAL
UROBILINOGEN UR STRIP-ACNC: NEGATIVE EU/DL
WBC # BLD AUTO: 11.08 K/UL (ref 3.9–12.7)

## 2019-04-29 PROCEDURE — 81025 URINE PREGNANCY TEST: CPT

## 2019-04-29 PROCEDURE — 36415 COLL VENOUS BLD VENIPUNCTURE: CPT

## 2019-04-29 PROCEDURE — 85651 RBC SED RATE NONAUTOMATED: CPT

## 2019-04-29 PROCEDURE — 80053 COMPREHEN METABOLIC PANEL: CPT

## 2019-04-29 PROCEDURE — 63600175 PHARM REV CODE 636 W HCPCS: Performed by: EMERGENCY MEDICINE

## 2019-04-29 PROCEDURE — 86140 C-REACTIVE PROTEIN: CPT

## 2019-04-29 PROCEDURE — 25000003 PHARM REV CODE 250: Performed by: NURSE PRACTITIONER

## 2019-04-29 PROCEDURE — 11000001 HC ACUTE MED/SURG PRIVATE ROOM

## 2019-04-29 PROCEDURE — 97165 OT EVAL LOW COMPLEX 30 MIN: CPT

## 2019-04-29 PROCEDURE — S4991 NICOTINE PATCH NONLEGEND: HCPCS | Performed by: NURSE PRACTITIONER

## 2019-04-29 PROCEDURE — 25000003 PHARM REV CODE 250: Performed by: EMERGENCY MEDICINE

## 2019-04-29 PROCEDURE — 81003 URINALYSIS AUTO W/O SCOPE: CPT | Mod: 59

## 2019-04-29 PROCEDURE — 25500020 PHARM REV CODE 255: Performed by: INTERNAL MEDICINE

## 2019-04-29 PROCEDURE — 80307 DRUG TEST PRSMV CHEM ANLYZR: CPT

## 2019-04-29 PROCEDURE — 63600175 PHARM REV CODE 636 W HCPCS: Performed by: NURSE PRACTITIONER

## 2019-04-29 PROCEDURE — 97530 THERAPEUTIC ACTIVITIES: CPT

## 2019-04-29 PROCEDURE — 63600175 PHARM REV CODE 636 W HCPCS: Performed by: HOSPITALIST

## 2019-04-29 PROCEDURE — 25000003 PHARM REV CODE 250: Performed by: HOSPITALIST

## 2019-04-29 PROCEDURE — 85025 COMPLETE CBC W/AUTO DIFF WBC: CPT

## 2019-04-29 RX ORDER — DIPHENHYDRAMINE HYDROCHLORIDE 50 MG/ML
25 INJECTION INTRAMUSCULAR; INTRAVENOUS EVERY 6 HOURS PRN
Status: DISCONTINUED | OUTPATIENT
Start: 2019-04-29 | End: 2019-04-29

## 2019-04-29 RX ORDER — IBUPROFEN 200 MG
1 TABLET ORAL DAILY
Status: DISCONTINUED | OUTPATIENT
Start: 2019-04-30 | End: 2019-04-29

## 2019-04-29 RX ORDER — SERTRALINE HYDROCHLORIDE 25 MG/1
25 TABLET, FILM COATED ORAL NIGHTLY
Status: DISCONTINUED | OUTPATIENT
Start: 2019-04-29 | End: 2019-04-30 | Stop reason: HOSPADM

## 2019-04-29 RX ORDER — IBUPROFEN 200 MG
24 TABLET ORAL
Status: DISCONTINUED | OUTPATIENT
Start: 2019-04-29 | End: 2019-04-30 | Stop reason: HOSPADM

## 2019-04-29 RX ORDER — HYDROMORPHONE HYDROCHLORIDE 2 MG/ML
1 INJECTION, SOLUTION INTRAMUSCULAR; INTRAVENOUS; SUBCUTANEOUS
Status: COMPLETED | OUTPATIENT
Start: 2019-04-29 | End: 2019-04-29

## 2019-04-29 RX ORDER — DIPHENHYDRAMINE HCL 25 MG
25 CAPSULE ORAL EVERY 6 HOURS PRN
Status: DISCONTINUED | OUTPATIENT
Start: 2019-04-29 | End: 2019-04-30 | Stop reason: HOSPADM

## 2019-04-29 RX ORDER — IBUPROFEN 200 MG
16 TABLET ORAL
Status: DISCONTINUED | OUTPATIENT
Start: 2019-04-29 | End: 2019-04-30 | Stop reason: HOSPADM

## 2019-04-29 RX ORDER — GLUCAGON 1 MG
1 KIT INJECTION
Status: DISCONTINUED | OUTPATIENT
Start: 2019-04-29 | End: 2019-04-30 | Stop reason: HOSPADM

## 2019-04-29 RX ORDER — ONDANSETRON 2 MG/ML
4 INJECTION INTRAMUSCULAR; INTRAVENOUS EVERY 8 HOURS PRN
Status: DISCONTINUED | OUTPATIENT
Start: 2019-04-29 | End: 2019-04-30 | Stop reason: HOSPADM

## 2019-04-29 RX ORDER — HYDROCODONE BITARTRATE AND ACETAMINOPHEN 10; 325 MG/1; MG/1
1 TABLET ORAL EVERY 6 HOURS PRN
Status: DISCONTINUED | OUTPATIENT
Start: 2019-04-29 | End: 2019-04-29

## 2019-04-29 RX ORDER — HYDROCODONE BITARTRATE AND ACETAMINOPHEN 10; 325 MG/1; MG/1
1 TABLET ORAL EVERY 8 HOURS PRN
Status: DISCONTINUED | OUTPATIENT
Start: 2019-04-29 | End: 2019-04-30 | Stop reason: HOSPADM

## 2019-04-29 RX ORDER — KETOROLAC TROMETHAMINE 10 MG/1
10 TABLET, FILM COATED ORAL EVERY 6 HOURS PRN
Status: DISCONTINUED | OUTPATIENT
Start: 2019-04-29 | End: 2019-04-30 | Stop reason: HOSPADM

## 2019-04-29 RX ORDER — IBUPROFEN 200 MG
1 TABLET ORAL DAILY
Status: DISCONTINUED | OUTPATIENT
Start: 2019-04-29 | End: 2019-04-30 | Stop reason: HOSPADM

## 2019-04-29 RX ORDER — ACETAMINOPHEN 325 MG/1
650 TABLET ORAL EVERY 8 HOURS PRN
Status: DISCONTINUED | OUTPATIENT
Start: 2019-04-29 | End: 2019-04-30 | Stop reason: HOSPADM

## 2019-04-29 RX ORDER — SODIUM CHLORIDE 0.9 % (FLUSH) 0.9 %
5 SYRINGE (ML) INJECTION
Status: DISCONTINUED | OUTPATIENT
Start: 2019-04-29 | End: 2019-04-30 | Stop reason: HOSPADM

## 2019-04-29 RX ADMIN — KETOROLAC TROMETHAMINE 10 MG: 10 TABLET, FILM COATED ORAL at 10:04

## 2019-04-29 RX ADMIN — PIPERACILLIN AND TAZOBACTAM 4.5 G: 4; .5 INJECTION, POWDER, LYOPHILIZED, FOR SOLUTION INTRAVENOUS; PARENTERAL at 07:04

## 2019-04-29 RX ADMIN — HYDROCODONE BITARTRATE AND ACETAMINOPHEN 1 TABLET: 10; 325 TABLET ORAL at 12:04

## 2019-04-29 RX ADMIN — VANCOMYCIN HYDROCHLORIDE 750 MG: 750 INJECTION, POWDER, LYOPHILIZED, FOR SOLUTION INTRAVENOUS at 02:04

## 2019-04-29 RX ADMIN — DIPHENHYDRAMINE HYDROCHLORIDE 25 MG: 50 INJECTION, SOLUTION INTRAMUSCULAR; INTRAVENOUS at 03:04

## 2019-04-29 RX ADMIN — IOHEXOL 75 ML: 350 INJECTION, SOLUTION INTRAVENOUS at 12:04

## 2019-04-29 RX ADMIN — VANCOMYCIN HYDROCHLORIDE 1750 MG: 750 INJECTION, POWDER, LYOPHILIZED, FOR SOLUTION INTRAVENOUS at 02:04

## 2019-04-29 RX ADMIN — PIPERACILLIN AND TAZOBACTAM 4.5 G: 4; .5 INJECTION, POWDER, LYOPHILIZED, FOR SOLUTION INTRAVENOUS; PARENTERAL at 12:04

## 2019-04-29 RX ADMIN — ONDANSETRON 4 MG: 2 INJECTION INTRAMUSCULAR; INTRAVENOUS at 08:04

## 2019-04-29 RX ADMIN — HYDROMORPHONE HYDROCHLORIDE 1 MG: 2 INJECTION INTRAMUSCULAR; INTRAVENOUS; SUBCUTANEOUS at 01:04

## 2019-04-29 RX ADMIN — SERTRALINE HYDROCHLORIDE 25 MG: 25 TABLET ORAL at 08:04

## 2019-04-29 RX ADMIN — DIPHENHYDRAMINE HYDROCHLORIDE 25 MG: 50 INJECTION, SOLUTION INTRAMUSCULAR; INTRAVENOUS at 12:04

## 2019-04-29 RX ADMIN — KETOROLAC TROMETHAMINE 10 MG: 10 TABLET, FILM COATED ORAL at 04:04

## 2019-04-29 RX ADMIN — HYDROCODONE BITARTRATE AND ACETAMINOPHEN 1 TABLET: 10; 325 TABLET ORAL at 06:04

## 2019-04-29 RX ADMIN — Medication 1 PATCH: at 07:04

## 2019-04-29 RX ADMIN — KETOROLAC TROMETHAMINE 10 MG: 10 TABLET, FILM COATED ORAL at 08:04

## 2019-04-29 RX ADMIN — DIPHENHYDRAMINE HYDROCHLORIDE 25 MG: 50 INJECTION, SOLUTION INTRAMUSCULAR; INTRAVENOUS at 07:04

## 2019-04-29 RX ADMIN — CEFTRIAXONE 1 G: 1 INJECTION, SOLUTION INTRAVENOUS at 01:04

## 2019-04-29 NOTE — PROGRESS NOTES
[facility] Hospital Medicine  Progress Note    Patient Name: Manuela Dia  MRN: [id number]  Patient Class: IP- Inpatient   Admission Date: 4/29/2019  Length of Stay: 0 days  Attending Physician: Killian Mota MD  Primary Care Provider: Primary Doctor No        Subjective:     Principal Problem:Infection of right hand    HPI:  38F presents with right hand wound check today.  She had incurred right hand cut from washing dishes on 4/25, had difficult flexing right index finger due to pain and swelling around 2nd MCP, presented to ER at the time and wound managed with silver nitrate and topical antibacterial ointment.   She presented again yesterday with same symptoms.  Was given PO clindamycin and PO norco from ER and sent home from ER.  She presents again today with reports of increase in swelling and erythema.  Patient denies any fever, chills, drainage from wound, N/V, extremity weakness/numbness/tingling, and all other sxs at this time.   XR show no fracture or cellulitis.  Orthopedic surgery consulted in ER.  Patient given IV rocephin and vancomycin and 1mg IV dilaudid in ER.  Hospital medicine called for admission.           Hospital Course:  Patient is admitted for hand cellulitis at the site of the right MCP joint.  She cut her hand 4 days earlier when washing dishes.  She was prescribed clindamycin for 2 days earlier which she had taken. The area worsened therefore, she presented for evaluation.   Vancomycin and Zosyn in progress.  Orthopedics evaluated the patient and recommended treating cellulitis with no need for surgical intervention.  CT of hand is negative for osteo, abscess and tenosynovitis.  Wound care saw the patient and applied dressing. OT eval and treat ordered.     Interval History:  Has pain and swelling to the right hand especially at the right MCP joint. There is small amount of index finger swelling.     Review of Systems   Constitutional: Negative for chills,  diaphoresis, fatigue, fever and unexpected weight change.   HENT: Negative for congestion, facial swelling, sore throat and trouble swallowing.    Eyes: Negative for photophobia, redness and visual disturbance.   Respiratory: Negative for apnea, cough, chest tightness, shortness of breath and wheezing.    Cardiovascular: Negative for chest pain, palpitations and leg swelling.   Gastrointestinal: Negative for abdominal distention, abdominal pain, blood in stool, constipation, diarrhea, nausea and vomiting.   Endocrine: Negative for polydipsia, polyphagia and polyuria.   Genitourinary: Negative for difficulty urinating, dysuria, flank pain, frequency, hematuria and urgency.   Musculoskeletal: Positive for arthralgias and myalgias. Negative for back pain, joint swelling and neck stiffness.   Skin: Positive for wound. Negative for pallor and rash.   Allergic/Immunologic: Negative for immunocompromised state.   Neurological: Negative for dizziness, tremors, syncope, weakness, light-headedness and headaches.   Psychiatric/Behavioral: Negative for agitation, confusion and suicidal ideas.     Objective:     Vital Signs (Most Recent):  Temp: 98.4 °F (36.9 °C) (04/29/19 1234)  Pulse: (!) 52 (04/29/19 1234)  Resp: 18 (04/29/19 1234)  BP: 114/83 (04/29/19 1234)  SpO2: 100 % (04/29/19 1234) Vital Signs (24h Range):  Temp:  [97.9 °F (36.6 °C)-98.4 °F (36.9 °C)] 98.4 °F (36.9 °C)  Pulse:  [51-63] 52  Resp:  [16-20] 18  SpO2:  [98 %-100 %] 100 %  BP: (107-146)/(58-97) 114/83     Weight: 56.4 kg (124 lb 5.4 oz)  Body mass index is 20.07 kg/m².    Intake/Output Summary (Last 24 hours) at 4/29/2019 1539  Last data filed at 4/29/2019 0800  Gross per 24 hour   Intake 180 ml   Output --   Net 180 ml      Physical Exam   Constitutional: She is oriented to person, place, and time. She appears well-developed and well-nourished. No distress.   HENT:   Head: Normocephalic and atraumatic.   Eyes: Conjunctivae are normal. No scleral icterus.    Neck: Normal range of motion. Neck supple. No JVD present. No thyromegaly present.   Cardiovascular: Normal rate, regular rhythm and intact distal pulses. Exam reveals no gallop and no friction rub.   No murmur heard.  Pulmonary/Chest: Effort normal and breath sounds normal. No respiratory distress. She has no wheezes. She has no rales. She exhibits no tenderness.   Abdominal: She exhibits no distension and no mass. There is no tenderness. There is no guarding.   Musculoskeletal: She exhibits tenderness.   Right hand, mild edema, painful flexion/extension of 2nd digit   Neurological: She is alert and oriented to person, place, and time. No cranial nerve deficit.   Skin: Skin is warm and dry. Capillary refill takes less than 2 seconds. No rash noted. She is not diaphoretic. No erythema.   To lateral aspect of the MCP joint there is circular wound approx 1 cm in size that is darkened with scabbing.    Psychiatric: She has a normal mood and affect.   Nursing note and vitals reviewed.      Significant Labs:   CBC:   Recent Labs   Lab 04/29/19  0135   WBC 11.08   HGB 14.0   HCT 40.6        CMP:   Recent Labs   Lab 04/29/19  0135      K 3.6      CO2 20*      BUN 7   CREATININE 0.7   CALCIUM 9.0   PROT 6.4   ALBUMIN 3.4*   BILITOT 0.4   ALKPHOS 47*   AST 10   ALT 12   ANIONGAP 10   EGFRNONAA >60     All pertinent labs within the past 24 hours have been reviewed.    Significant Imaging: I have reviewed all pertinent imaging results/findings within the past 24 hours.    Assessment/Plan:      * Infection of right hand  - check ESR, CRP are both negative  - continue IV Zosyn/vancomycin  - follow up ortho recs - advises to treat cellulitis - no surgical intervention warranted  CT of Hand was negative for osteomyelitis, abscess and tenosynovitis  OT eval and treat pending  Wound care consult        VTE Risk Mitigation (From admission, onward)        Ordered     Place sequential compression device   Until discontinued      04/29/19 0309     Place RENZO hose  Until discontinued      04/29/19 0309     IP VTE LOW RISK PATIENT  Once      04/29/19 0309              Ann Marie Bustamante NP  Department of Hospital Medicine   [facility]

## 2019-04-29 NOTE — SUBJECTIVE & OBJECTIVE
Interval History:  Has pain and swelling to the right hand especially at the right MCP joint. There is small amount of index finger swelling.     Review of Systems   Constitutional: Negative for chills, diaphoresis, fatigue, fever and unexpected weight change.   HENT: Negative for congestion, facial swelling, sore throat and trouble swallowing.    Eyes: Negative for photophobia, redness and visual disturbance.   Respiratory: Negative for apnea, cough, chest tightness, shortness of breath and wheezing.    Cardiovascular: Negative for chest pain, palpitations and leg swelling.   Gastrointestinal: Negative for abdominal distention, abdominal pain, blood in stool, constipation, diarrhea, nausea and vomiting.   Endocrine: Negative for polydipsia, polyphagia and polyuria.   Genitourinary: Negative for difficulty urinating, dysuria, flank pain, frequency, hematuria and urgency.   Musculoskeletal: Positive for arthralgias and myalgias. Negative for back pain, joint swelling and neck stiffness.   Skin: Positive for wound. Negative for pallor and rash.   Allergic/Immunologic: Negative for immunocompromised state.   Neurological: Negative for dizziness, tremors, syncope, weakness, light-headedness and headaches.   Psychiatric/Behavioral: Negative for agitation, confusion and suicidal ideas.     Objective:     Vital Signs (Most Recent):  Temp: 98.4 °F (36.9 °C) (04/29/19 1234)  Pulse: (!) 52 (04/29/19 1234)  Resp: 18 (04/29/19 1234)  BP: 114/83 (04/29/19 1234)  SpO2: 100 % (04/29/19 1234) Vital Signs (24h Range):  Temp:  [97.9 °F (36.6 °C)-98.4 °F (36.9 °C)] 98.4 °F (36.9 °C)  Pulse:  [51-63] 52  Resp:  [16-20] 18  SpO2:  [98 %-100 %] 100 %  BP: (107-146)/(58-97) 114/83     Weight: 56.4 kg (124 lb 5.4 oz)  Body mass index is 20.07 kg/m².    Intake/Output Summary (Last 24 hours) at 4/29/2019 1539  Last data filed at 4/29/2019 0800  Gross per 24 hour   Intake 180 ml   Output --   Net 180 ml      Physical Exam   Constitutional:  She is oriented to person, place, and time. She appears well-developed and well-nourished. No distress.   HENT:   Head: Normocephalic and atraumatic.   Eyes: Conjunctivae are normal. No scleral icterus.   Neck: Normal range of motion. Neck supple. No JVD present. No thyromegaly present.   Cardiovascular: Normal rate, regular rhythm and intact distal pulses. Exam reveals no gallop and no friction rub.   No murmur heard.  Pulmonary/Chest: Effort normal and breath sounds normal. No respiratory distress. She has no wheezes. She has no rales. She exhibits no tenderness.   Abdominal: She exhibits no distension and no mass. There is no tenderness. There is no guarding.   Musculoskeletal: She exhibits tenderness.   Right hand, mild edema, painful flexion/extension of 2nd digit   Neurological: She is alert and oriented to person, place, and time. No cranial nerve deficit.   Skin: Skin is warm and dry. Capillary refill takes less than 2 seconds. No rash noted. She is not diaphoretic. No erythema.   To lateral aspect of the MCP joint there is circular wound approx 1 cm in size that is darkened with scabbing.    Psychiatric: She has a normal mood and affect.   Nursing note and vitals reviewed.      Significant Labs:   CBC:   Recent Labs   Lab 04/29/19  0135   WBC 11.08   HGB 14.0   HCT 40.6        CMP:   Recent Labs   Lab 04/29/19  0135      K 3.6      CO2 20*      BUN 7   CREATININE 0.7   CALCIUM 9.0   PROT 6.4   ALBUMIN 3.4*   BILITOT 0.4   ALKPHOS 47*   AST 10   ALT 12   ANIONGAP 10   EGFRNONAA >60     All pertinent labs within the past 24 hours have been reviewed.    Significant Imaging: I have reviewed all pertinent imaging results/findings within the past 24 hours.

## 2019-04-29 NOTE — H&P
[facility] Hospital Medicine  History & Physical    Patient Name: Manuela Dia  MRN: [id number]  Admission Date: 4/29/2019  Attending Physician: Teo Garcia MD   Primary Care Provider: Primary Doctor No         Patient information was obtained from patient and ER records.     Subjective:     Principal Problem:Infection of right hand    Chief Complaint:   Chief Complaint   Patient presents with    Wound Check     reports increase in redness and swelling to wound to right hand. Sts was seen here yesterday and given abx without improvement        HPI: 38F presents with right hand wound check today.  She had incurred right hand cut from washing dishes on 4/25, had difficult flexing right index finger due to pain and swelling around 2nd MCP, presented to ER at the time and wound managed with silver nitrate and topical antibacterial ointment.   She presented again yesterday with same symptoms.  Was given PO clindamycin and PO norco from ER and sent home from ER.  She presents again today with reports of increase in swelling and erythema.  Patient denies any fever, chills, drainage from wound, N/V, extremity weakness/numbness/tingling, and all other sxs at this time.   XR show no fracture or cellulitis.  Orthopedic surgery consulted in ER.  Patient given IV rocephin and vancomycin and 1mg IV dilaudid in ER.  Hospital medicine called for admission.           Past Medical History:   Diagnosis Date    Anxiety     Bipolar 1 disorder     Chronic back pain     Depression     Spine degeneration        Past Surgical History:   Procedure Laterality Date    DILATION AND CURETTAGE OF UTERUS      HYSTERECTOMY         Review of patient's allergies indicates:   Allergen Reactions    Penicillins     Tramadol Nausea And Vomiting and Other (See Comments)     Extreme fatigue, weakness  Extreme fatigue, weakness       No current facility-administered medications on file prior to encounter.      Current  Outpatient Medications on File Prior to Encounter   Medication Sig    clindamycin (CLEOCIN) 300 MG capsule Take 1 capsule (300 mg total) by mouth every 6 (six) hours. for 7 days    HYDROcodone-acetaminophen (NORCO) 5-325 mg per tablet Take 1 tablet by mouth every 6 (six) hours as needed.    mupirocin (BACTROBAN) 2 % ointment Apply topically 3 (three) times daily.     Family History     Problem Relation (Age of Onset)    COPD Mother    Cancer Mother    Diabetes Mother    Hyperlipidemia Mother    Hypertension Mother    Pulmonary fibrosis Mother    Scleroderma Mother        Tobacco Use    Smoking status: Current Every Day Smoker     Packs/day: 1.00     Types: Cigarettes    Smokeless tobacco: Never Used   Substance and Sexual Activity    Alcohol use: No    Drug use: Yes     Types: Marijuana    Sexual activity: Yes     Partners: Male     Birth control/protection: See Surgical Hx     Review of Systems   Constitutional: Negative for chills, diaphoresis, fatigue, fever and unexpected weight change.   HENT: Negative for congestion, facial swelling, sore throat and trouble swallowing.    Eyes: Negative for photophobia, redness and visual disturbance.   Respiratory: Negative for apnea, cough, chest tightness, shortness of breath and wheezing.    Cardiovascular: Negative for chest pain, palpitations and leg swelling.   Gastrointestinal: Negative for abdominal distention, abdominal pain, blood in stool, constipation, diarrhea, nausea and vomiting.   Endocrine: Negative for polydipsia, polyphagia and polyuria.   Genitourinary: Negative for difficulty urinating, dysuria, flank pain, frequency, hematuria and urgency.   Musculoskeletal: Negative for arthralgias, back pain, joint swelling, myalgias and neck stiffness.   Skin: Positive for wound. Negative for pallor and rash.   Allergic/Immunologic: Negative for immunocompromised state.   Neurological: Negative for dizziness, tremors, syncope, weakness, light-headedness and  headaches.   Psychiatric/Behavioral: Negative for agitation, confusion and suicidal ideas.     Objective:     Vital Signs (Most Recent):  Temp: 98.3 °F (36.8 °C) (04/29/19 0144)  Pulse: (!) 56 (04/29/19 0332)  Resp: 18 (04/28/19 2324)  BP: 130/74 (04/29/19 0332)  SpO2: 98 % (04/29/19 0332) Vital Signs (24h Range):  Temp:  [98.3 °F (36.8 °C)-98.4 °F (36.9 °C)] 98.3 °F (36.8 °C)  Pulse:  [56-63] 56  Resp:  [18] 18  SpO2:  [98 %-100 %] 98 %  BP: (130-146)/(74-97) 130/74     Weight: 56.4 kg (124 lb 5.4 oz)  Body mass index is 20.07 kg/m².    Physical Exam   Constitutional: She is oriented to person, place, and time. She appears well-developed and well-nourished. No distress.   HENT:   Head: Normocephalic and atraumatic.   Eyes: Pupils are equal, round, and reactive to light. Conjunctivae and EOM are normal. No scleral icterus.   Neck: Normal range of motion. Neck supple. No JVD present. No thyromegaly present.   Cardiovascular: Normal rate, regular rhythm and intact distal pulses. Exam reveals no gallop and no friction rub.   No murmur heard.  Pulmonary/Chest: Effort normal and breath sounds normal. No respiratory distress. She has no wheezes. She has no rales. She exhibits no tenderness.   Abdominal: Soft. Bowel sounds are normal. She exhibits no distension and no mass. There is no tenderness. There is no guarding.   Musculoskeletal: She exhibits tenderness.   Right hand, mild edema, painful flexion/extension of 2nd digit   Neurological: She is alert and oriented to person, place, and time. No cranial nerve deficit.   Skin: Skin is warm and dry. Capillary refill takes less than 2 seconds. No rash noted. She is not diaphoretic. No erythema.   Psychiatric: She has a normal mood and affect.   Nursing note and vitals reviewed.  (current wound show no significant changes from below photo from 4/27/19)          CRANIAL NERVES     CN III, IV, VI   Pupils are equal, round, and reactive to light.  Extraocular motions are  normal.        Significant Labs:   CBC:   Recent Labs   Lab 04/29/19 0135   WBC 11.08   HGB 14.0   HCT 40.6        CMP:   Recent Labs   Lab 04/29/19 0135      K 3.6      CO2 20*      BUN 7   CREATININE 0.7   CALCIUM 9.0   PROT 6.4   ALBUMIN 3.4*   BILITOT 0.4   ALKPHOS 47*   AST 10   ALT 12   ANIONGAP 10   EGFRNONAA >60     Urine Studies:   Recent Labs   Lab 04/29/19 0318   COLORU Yellow   APPEARANCEUA Clear   PHUR 8.0   SPECGRAV 1.015   PROTEINUA Negative   GLUCUA Negative   KETONESU Negative   BILIRUBINUA Negative   OCCULTUA Negative   NITRITE Negative   UROBILINOGEN Negative   LEUKOCYTESUR Negative     All pertinent labs within the past 24 hours have been reviewed.    Significant Imaging: I have reviewed all pertinent imaging results/findings within the past 24 hours.   Imaging Results          X-Ray Hand 3 View Right (In process)    Procedure changed from X-Ray Hand 3 view Left                   Assessment/Plan:     * Infection of right hand  - check ESR, CRP to monitor inflammatory response/improvement on abx  - continue IV rocephin/vancomycin  - follow up ortho recs        VTE Risk Mitigation (From admission, onward)        Ordered     Place sequential compression device  Until discontinued      04/29/19 0309     Place RENZO hose  Until discontinued      04/29/19 0309     IP VTE LOW RISK PATIENT  Once      04/29/19 0309             Cosme Dominguez MD  Department of Hospital Medicine   [facility]

## 2019-04-29 NOTE — HOSPITAL COURSE
Patient is admitted for hand cellulitis at the site of the right MCP joint.  She cut her right hand 4 days earlier when washing dishes.  She was prescribed clindamycin 2 days earlier which she had taken. The area worsened therefore, she presented for evaluation.   Vancomycin and Zosyn in progress.  Orthopedics evaluated the patient and recommended treating cellulitis with no need for surgical intervention.  CT of hand was negative for osteo, abscess and tenosynovitis.  Wound care saw the patient and applied dressing to assist with debriding area that silver nitrate had cauterized area of bleeding.  OT eval and treat ordered an outpatient OT was prescribed at time of discharge.  The next day, swelling to the right index finger had decreased and there was improved range of motion.  Erythema had not increased.  There was some swelling to the hand that was present.  Patient was afebrile and labs were stable.  She was seen and examined and determined to be safe and stable for discharge. She was prescribed another 6 days of clindamycin and advised to complete her original prescription of clindamycin.  Also, patient was arranged outpatient follow-up appointment with [facility] clinic.  Patient was advised to return to the emergency room with worsened redness, worsened pain and swelling.

## 2019-04-29 NOTE — PLAN OF CARE
Sw met with patient at bedside to complete assessment. No family present at time of visit. Pt reports she is independent and denies any post hospital needs or services at this time. However, pt reports she radha need assistance with obtaining a PCP. Transitional Care Folder, Discharge Planning Begins on Admission pamphlet, [facility], Advance Directive information given to patient along with the contact information given. Sw placed contact information on white board. Sw instructed patient or family to call with any questions or concerns.     D/C Plan: Home  D/C Trans: Family     04/29/19 0946   Discharge Assessment   Assessment Type Discharge Planning Assessment   Confirmed/corrected address and phone number on facesheet? Yes   Assessment information obtained from? Patient   Communicated expected length of stay with patient/caregiver yes   Prior to hospitilization cognitive status: Alert/Oriented   Prior to hospitalization functional status: Independent   Current cognitive status: Alert/Oriented   Current Functional Status: Independent   Facility Arrived From: Home   Lives With significant other   Able to Return to Prior Arrangements yes   Is patient able to care for self after discharge? Yes   Patient's perception of discharge disposition home or selfcare   Readmission Within the Last 30 Days no previous admission in last 30 days   Patient currently being followed by outpatient case management? No   Patient currently receives any other outside agency services? No   Equipment Currently Used at Home none   Do you have any problems affording any of your prescribed medications? TBD   Is the patient taking medications as prescribed? yes   Does the patient have transportation home? Yes   Transportation Anticipated family or friend will provide   Does the patient receive services at the [facility]madin Clinic? No   Discharge Plan A Home with family   Discharge Plan B Home with family   DME Needed  Upon Discharge  none   Patient/Family in Agreement with Plan yes     Pt uses   [id number]  [place] - [address] AT [facility]  [address]  [place] [address]  Phone: [phone] Fax: [phone]       04/29/19 0946   Discharge Assessment   Assessment Type Discharge Planning Assessment   Confirmed/corrected address and phone number on facesheet? Yes   Assessment information obtained from? Patient   Communicated expected length of stay with patient/caregiver yes   Prior to hospitilization cognitive status: Alert/Oriented   Prior to hospitalization functional status: Independent   Current cognitive status: Alert/Oriented   Current Functional Status: Independent   Facility Arrived From: Home   Lives With significant other   Able to Return to Prior Arrangements yes   Is patient able to care for self after discharge? Yes   Patient's perception of discharge disposition home or selfcare   Readmission Within the Last 30 Days no previous admission in last 30 days   Patient currently being followed by outpatient case management? No   Patient currently receives any other outside agency services? No   Equipment Currently Used at Home none   Do you have any problems affording any of your prescribed medications? TBD   Is the patient taking medications as prescribed? yes   Does the patient have transportation home? Yes   Transportation Anticipated family or friend will provide   Does the patient receive services at the [facility]mad[facility] Clinic? No   Discharge Plan A Home with family   Discharge Plan B Home with family   DME Needed Upon Discharge  none   Patient/Family in Agreement with Plan yes

## 2019-04-29 NOTE — PROGRESS NOTES
Pharmacokinetic Initial Assessment: IV Vancomycin    Assessment/Plan:    Initiate intravenous vancomycin with loading dose of 1750 mg once followed by a maintenance dose of vancomycin 750mg IV every 12 hours  Desired empiric serum trough concentration is 10 to 15 mcg/mL.  Draw vancomycin trough level 30 min prior to fourth dose on 4/30 at approximately 14:00   Pharmacy will continue to follow and monitor vancomycin.      Please contact pharmacy at extension [phone] with any questions regarding this assessment.     Thank you for the consult,   Karl Gilmore     Patient brief summary:  Manuela Dia is a 38 y.o. female initiated on antimicrobial therapy with IV Vancomycin for treatment of suspected skin & soft tissue    Drug Allergies:   Review of patient's allergies indicates:   Allergen Reactions    Penicillins     Tramadol Nausea And Vomiting and Other (See Comments)     Extreme fatigue, weakness  Extreme fatigue, weakness       Actual Body Weight:   56.4    Renal Function:   Estimated Creatinine Clearance: 98.9 mL/min (based on SCr of 0.7 mg/dL).,     Dialysis Method (if applicable):  N/A     CBC (last 72 hours):  Recent Labs   Lab Result Units 04/29/19  0135   WBC K/uL 11.08   Hemoglobin g/dL 14.0   Hematocrit % 40.6   Platelets K/uL 196   Gran% % 57.7   Lymph% % 33.6   Mono% % 7.2   Eosinophil% % 1.3   Basophil% % 0.2   Differential Method  Automated       Metabolic Panel (last 72 hours):  Recent Labs   Lab Result Units 04/29/19  0135 04/29/19  0318   Sodium mmol/L 138  --    Potassium mmol/L 3.6  --    Chloride mmol/L 108  --    CO2 mmol/L 20*  --    Glucose mg/dL 107  --    Glucose, UA   --  Negative   BUN, Bld mg/dL 7  --    Creatinine mg/dL 0.7  --    Creatinine, Random Ur mg/dL  --  89.8   Albumin g/dL 3.4*  --    Total Bilirubin mg/dL 0.4  --    Alkaline Phosphatase U/L 47*  --    AST U/L 10  --    ALT U/L 12  --        Drug levels (last 3 results):  No results for input(s): VANCOMYCINRA, VANCOMYCINPE,  VANCOMYCINTR in the last 72 hours.    Microbiologic Results:  Microbiology Results (last 7 days)       ** No results found for the last 168 hours. **

## 2019-04-29 NOTE — PLAN OF CARE
Problem: Adult Inpatient Plan of Care  Goal: Plan of Care Review  Outcome: Ongoing (interventions implemented as appropriate)  Pt's pain managed with prn med-effective;Right hand significantly swollen, warm, and red. Tolerating IV antibiotics with prn benadryl; fall precautions maintained. All needs met. Will continue to monitor.

## 2019-04-29 NOTE — SUBJECTIVE & OBJECTIVE
Past Medical History:   Diagnosis Date    Anxiety     Bipolar 1 disorder     Chronic back pain     Depression     Spine degeneration        Past Surgical History:   Procedure Laterality Date    DILATION AND CURETTAGE OF UTERUS      HYSTERECTOMY         Review of patient's allergies indicates:   Allergen Reactions    Penicillins     Tramadol Nausea And Vomiting and Other (See Comments)     Extreme fatigue, weakness  Extreme fatigue, weakness       No current facility-administered medications on file prior to encounter.      Current Outpatient Medications on File Prior to Encounter   Medication Sig    clindamycin (CLEOCIN) 300 MG capsule Take 1 capsule (300 mg total) by mouth every 6 (six) hours. for 7 days    HYDROcodone-acetaminophen (NORCO) 5-325 mg per tablet Take 1 tablet by mouth every 6 (six) hours as needed.    mupirocin (BACTROBAN) 2 % ointment Apply topically 3 (three) times daily.     Family History     Problem Relation (Age of Onset)    COPD Mother    Cancer Mother    Diabetes Mother    Hyperlipidemia Mother    Hypertension Mother    Pulmonary fibrosis Mother    Scleroderma Mother        Tobacco Use    Smoking status: Current Every Day Smoker     Packs/day: 1.00     Types: Cigarettes    Smokeless tobacco: Never Used   Substance and Sexual Activity    Alcohol use: No    Drug use: Yes     Types: Marijuana    Sexual activity: Yes     Partners: Male     Birth control/protection: See Surgical Hx     Review of Systems   Constitutional: Negative for chills, diaphoresis, fatigue, fever and unexpected weight change.   HENT: Negative for congestion, facial swelling, sore throat and trouble swallowing.    Eyes: Negative for photophobia, redness and visual disturbance.   Respiratory: Negative for apnea, cough, chest tightness, shortness of breath and wheezing.    Cardiovascular: Negative for chest pain, palpitations and leg swelling.   Gastrointestinal: Negative for abdominal distention, abdominal  pain, blood in stool, constipation, diarrhea, nausea and vomiting.   Endocrine: Negative for polydipsia, polyphagia and polyuria.   Genitourinary: Negative for difficulty urinating, dysuria, flank pain, frequency, hematuria and urgency.   Musculoskeletal: Negative for arthralgias, back pain, joint swelling, myalgias and neck stiffness.   Skin: Positive for wound. Negative for pallor and rash.   Allergic/Immunologic: Negative for immunocompromised state.   Neurological: Negative for dizziness, tremors, syncope, weakness, light-headedness and headaches.   Psychiatric/Behavioral: Negative for agitation, confusion and suicidal ideas.     Objective:     Vital Signs (Most Recent):  Temp: 98.3 °F (36.8 °C) (04/29/19 0144)  Pulse: (!) 56 (04/29/19 0332)  Resp: 18 (04/28/19 2324)  BP: 130/74 (04/29/19 0332)  SpO2: 98 % (04/29/19 0332) Vital Signs (24h Range):  Temp:  [98.3 °F (36.8 °C)-98.4 °F (36.9 °C)] 98.3 °F (36.8 °C)  Pulse:  [56-63] 56  Resp:  [18] 18  SpO2:  [98 %-100 %] 98 %  BP: (130-146)/(74-97) 130/74     Weight: 56.4 kg (124 lb 5.4 oz)  Body mass index is 20.07 kg/m².    Physical Exam   Constitutional: She is oriented to person, place, and time. She appears well-developed and well-nourished. No distress.   HENT:   Head: Normocephalic and atraumatic.   Eyes: Pupils are equal, round, and reactive to light. Conjunctivae and EOM are normal. No scleral icterus.   Neck: Normal range of motion. Neck supple. No JVD present. No thyromegaly present.   Cardiovascular: Normal rate, regular rhythm and intact distal pulses. Exam reveals no gallop and no friction rub.   No murmur heard.  Pulmonary/Chest: Effort normal and breath sounds normal. No respiratory distress. She has no wheezes. She has no rales. She exhibits no tenderness.   Abdominal: Soft. Bowel sounds are normal. She exhibits no distension and no mass. There is no tenderness. There is no guarding.   Musculoskeletal: She exhibits tenderness.   Right hand, mild  edema, painful flexion/extension of 2nd digit   Neurological: She is alert and oriented to person, place, and time. No cranial nerve deficit.   Skin: Skin is warm and dry. Capillary refill takes less than 2 seconds. No rash noted. She is not diaphoretic. No erythema.   Psychiatric: She has a normal mood and affect.   Nursing note and vitals reviewed.        CRANIAL NERVES     CN III, IV, VI   Pupils are equal, round, and reactive to light.  Extraocular motions are normal.        Significant Labs:   CBC:   Recent Labs   Lab 04/29/19  0135   WBC 11.08   HGB 14.0   HCT 40.6        CMP:   Recent Labs   Lab 04/29/19  0135      K 3.6      CO2 20*      BUN 7   CREATININE 0.7   CALCIUM 9.0   PROT 6.4   ALBUMIN 3.4*   BILITOT 0.4   ALKPHOS 47*   AST 10   ALT 12   ANIONGAP 10   EGFRNONAA >60     Urine Studies:   Recent Labs   Lab 04/29/19  0318   COLORU Yellow   APPEARANCEUA Clear   PHUR 8.0   SPECGRAV 1.015   PROTEINUA Negative   GLUCUA Negative   KETONESU Negative   BILIRUBINUA Negative   OCCULTUA Negative   NITRITE Negative   UROBILINOGEN Negative   LEUKOCYTESUR Negative     All pertinent labs within the past 24 hours have been reviewed.    Significant Imaging: I have reviewed all pertinent imaging results/findings within the past 24 hours.   Imaging Results          X-Ray Hand 3 View Right (In process)    Procedure changed from X-Ray Hand 3 view Left

## 2019-04-29 NOTE — CONSULTS
"Consulted on this 39 y/o F patient due to wound to right hand at base of index finger with associated edema, erythema, and pain. Patient reports 4/25 she cut her hand on a dish, and went to ER due to bleeding and had it "cauterized". She reports she went back to ER twice since then due to worsening, and this last time was admitted. Per chart review, ortho surgery evaluated her this morning and deemed no surgical intervention necessary. On assessment, black eschar noted to dorsal hand at base of index finger that measures 2x2x0.1cm, no drainage, no fluctuance. Surrounding edema and erythema is noted to index finger. Cleansed with saline. tegaderm applied to promote autolytic debridement. Patient educated in home care to include leaving dressing in place x5 days, then remove, cleanse with gentle liquid soap and water, pat dry, apply bactoban ointment and bandaid.  Will follow CT results.   "

## 2019-04-29 NOTE — ASSESSMENT & PLAN NOTE
- check ESR, CRP to monitor inflammatory response/improvement on abx  - continue IV rocephin/vancomycin  - follow up ortho recs

## 2019-04-29 NOTE — ASSESSMENT & PLAN NOTE
- check ESR, CRP are both negative  - continue IV Zosyn/vancomycin  - follow up ortho recs - advises to treat cellulitis - no surgical intervention warranted  CT of Hand was negative for osteomyelitis, abscess and tenosynovitis  OT eval and treat pending  Wound care consult

## 2019-04-29 NOTE — CONSULTS
[facility] -   Orthopedics  Consult Note    Patient Name: Manuela Dia  MRN: [id number]  Admission Date: 4/29/2019  Hospital Length of Stay: 0 days  Attending Provider: Teo Garcia MD  Primary Care Provider: Primary Doctor No    Patient information was obtained from parent and ER records.     Consults  Subjective:     Principal Problem:Infection of right hand    Chief Complaint:   Chief Complaint   Patient presents with    Wound Check     reports increase in redness and swelling to wound to right hand. Sts was seen here yesterday and given abx without improvement      HPI: 38F presents with right hand wound check today.  She had incurred right hand cut from washing dishes on 4/25, had difficult flexing right index finger due to pain and swelling around 2nd MCP, presented to ER at the time and wound managed with silver nitrate and topical antibacterial ointment.   She presented again yesterday with same symptoms.  Was given PO clindamycin and PO norco from ER and sent home from ER.  She presents again today with reports of increase in swelling and erythema.  Patient denies any fever, chills, drainage from wound, N/V, extremity weakness/numbness/tingling, and all other sxs at this time.   XR show no fracture or cellulitis.  Orthopedic surgery consulted in ER.  Patient given IV rocephin and vancomycin and 1mg IV dilaudid in ER.  Hospital medicine called for admission.                   Past Medical History:   Diagnosis Date    Anxiety      Bipolar 1 disorder      Chronic back pain      Depression      Spine degeneration                 Past Surgical History:   Procedure Laterality Date    DILATION AND CURETTAGE OF UTERUS        HYSTERECTOMY                   Review of patient's allergies indicates:   Allergen Reactions    Penicillins      Tramadol Nausea And Vomiting and Other (See Comments)       Extreme fatigue, weakness  Extreme fatigue, weakness         No current  facility-administered medications on file prior to encounter.            Current Outpatient Medications on File Prior to Encounter   Medication Sig    clindamycin (CLEOCIN) 300 MG capsule Take 1 capsule (300 mg total) by mouth every 6 (six) hours. for 7 days    HYDROcodone-acetaminophen (NORCO) 5-325 mg per tablet Take 1 tablet by mouth every 6 (six) hours as needed.    mupirocin (BACTROBAN) 2 % ointment Apply topically 3 (three) times daily.           Family History      Problem Relation (Age of Onset)     COPD Mother     Cancer Mother     Diabetes Mother     Hyperlipidemia Mother     Hypertension Mother     Pulmonary fibrosis Mother     Scleroderma Mother                Tobacco Use    Smoking status: Current Every Day Smoker       Packs/day: 1.00       Types: Cigarettes    Smokeless tobacco: Never Used   Substance and Sexual Activity    Alcohol use: No    Drug use: Yes       Types: Marijuana    Sexual activity: Yes       Partners: Male       Birth control/protection: See Surgical Hx      Review of Systems   Constitutional: Negative for chills, diaphoresis, fatigue, fever and unexpected weight change.   HENT: Negative for congestion, facial swelling, sore throat and trouble swallowing.    Eyes: Negative for photophobia, redness and visual disturbance.   Respiratory: Negative for apnea, cough, chest tightness, shortness of breath and wheezing.    Cardiovascular: Negative for chest pain, palpitations and leg swelling.   Gastrointestinal: Negative for abdominal distention, abdominal pain, blood in stool, constipation, diarrhea, nausea and vomiting.   Endocrine: Negative for polydipsia, polyphagia and polyuria.   Genitourinary: Negative for difficulty urinating, dysuria, flank pain, frequency, hematuria and urgency.   Musculoskeletal: Negative for arthralgias, back pain, joint swelling, myalgias and neck stiffness.   Skin: Positive for wound. Negative for pallor and rash.   Allergic/Immunologic: Negative for  immunocompromised state.   Neurological: Negative for dizziness, tremors, syncope, weakness, light-headedness and headaches.   Psychiatric/Behavioral: Negative for agitation, confusion and suicidal ideas.      Objective:      Vital Signs (Most Recent):  Temp: 98.3 °F (36.8 °C) (04/29/19 0144)  Pulse: (!) 56 (04/29/19 0332)  Resp: 18 (04/28/19 2324)  BP: 130/74 (04/29/19 0332)  SpO2: 98 % (04/29/19 0332) Vital Signs (24h Range):  Temp:  [98.3 °F (36.8 °C)-98.4 °F (36.9 °C)] 98.3 °F (36.8 °C)  Pulse:  [56-63] 56  Resp:  [18] 18  SpO2:  [98 %-100 %] 98 %  BP: (130-146)/(74-97) 130/74      Weight: 56.4 kg (124 lb 5.4 oz)  Body mass index is 20.07 kg/m².     Physical Exam   Constitutional: She is oriented to person, place, and time. She appears well-developed and well-nourished. No distress.   HENT:   Head: Normocephalic and atraumatic.   Eyes: Pupils are equal, round, and reactive to light. Conjunctivae and EOM are normal. No scleral icterus.   Neck: Normal range of motion. Neck supple. No JVD present. No thyromegaly present.   Cardiovascular: Normal rate, and intact distal pulses. Pulmonary/Chest: Effort normal No respiratory distress.She exhibits no tenderness.   Abdominal: Soft.  She exhibits no distension and no mass. There is no tenderness. There is no guarding.   Musculoskeletal: She exhibits tenderness.   Right hand, mild edema, painful flexion/extension of 2nd digit   -no obvious fluid collection, cellulitis is present surrounding wound  -low suspicion of septic joint  -nvi intact distally  Neurological: She is alert and oriented to person, place, and time. No cranial nerve deficit.   Skin: Skin is warm and dry. Capillary refill takes less than 2 seconds. No rash noted. She is not diaphoretic. No erythema.                Assessment/Plan:        * Infection of right hand - cellulitis present    Recs:  IV abx treatment  No drainable fluid collection evident on exam  No surgical intervention indicated at this  time  Continue local wound care  Call with questions or concerns          Thank you for your consult. I will sign off. Please contact us if you have any additional questions.    Abhishek Oden MD  Orthopedics  [facility] [place]

## 2019-04-29 NOTE — ED PROVIDER NOTES
SCRIBE #1 NOTE: I, Kate Phelps, am scribing for, and in the presence of, Teo Garcia MD. I have scribed the entire note.         History     Chief Complaint   Patient presents with    Wound Check     reports increase in redness and swelling to wound to right hand. Sts was seen here yesterday and given abx without improvement       Review of patient's allergies indicates:   Allergen Reactions    Penicillins     Tramadol Nausea And Vomiting and Other (See Comments)     Extreme fatigue, weakness  Extreme fatigue, weakness         History of Present Illness   HPI    4/29/2019, 1:17 AM  History obtained from the patient      History of Present Illness: Manuela Dia is a 38 y.o. female patient who presents to the Emergency Department for wound check. Patient reports initially cutting her R hand on glass on 4/25. Patient presented to the ED and wound was cauterized. Patient reports returning to the ED on 4/27 for re-evaluation of wound and was diagnosed with cellulitis and started on clindamycin and mupirocin ointment. Patient c/o increased swelling and erythema to R hand. Symptoms are constant and moderate in severity. No mitigating or exacerbating factors reported. No other sxs reported. Patient denies any fever, chills, drainage from wound, N/V, extremity weakness/numbness/tingling, and all other sxs at this time. Tetanus is UTD. No further complaints or concerns at this time.     Arrival mode: Personal vehicle      PCP: Primary Doctor No        Past Medical History:  Past Medical History:   Diagnosis Date    Anxiety     Bipolar 1 disorder     Chronic back pain     Depression     Spine degeneration        Past Surgical History:  Past Surgical History:   Procedure Laterality Date    DILATION AND CURETTAGE OF UTERUS      HYSTERECTOMY           Family History:  Family History   Problem Relation Age of Onset    Scleroderma Mother     Pulmonary fibrosis Mother     Diabetes Mother     Hypertension Mother      Hyperlipidemia Mother     Cancer Mother     COPD Mother        Social History:  Social History     Tobacco Use    Smoking status: Current Every Day Smoker     Packs/day: 1.00     Types: Cigarettes    Smokeless tobacco: Never Used   Substance and Sexual Activity    Alcohol use: No    Drug use: Yes     Types: Marijuana    Sexual activity: Yes     Partners: Male     Birth control/protection: See Surgical Hx        Review of Systems   Review of Systems   Constitutional: Negative for chills and fever.   HENT: Negative for sore throat.    Respiratory: Negative for shortness of breath.    Cardiovascular: Negative for chest pain.   Gastrointestinal: Negative for nausea and vomiting.   Genitourinary: Negative for dysuria.   Musculoskeletal: Negative for back pain.   Skin: Negative for rash.        (+) increased swelling and erythema to R hand  (-) drainage from wound   Neurological: Negative for weakness and numbness.        (-) extremity tingling   Hematological: Does not bruise/bleed easily.   All other systems reviewed and are negative.       Physical Exam     Initial Vitals [04/28/19 2324]   BP Pulse Resp Temp SpO2   (!) 146/97 63 18 98.4 °F (36.9 °C) 100 %      MAP       --          Physical Exam  Nursing Notes and Vital Signs Reviewed.  Constitutional: Patient is in no acute distress. Well-developed and well-nourished.  Head: Atraumatic. Normocephalic.  Eyes: PERRL. EOM intact. Conjunctivae are not pale. No scleral icterus.  ENT: Mucous membranes are moist. Oropharynx is clear and symmetric.    Neck: Supple. Full ROM. No lymphadenopathy.  Cardiovascular: Regular rate. Regular rhythm. No murmurs, rubs, or gallops. Distal pulses are 2+ and symmetric.  Pulmonary/Chest: No respiratory distress. Clear to auscultation bilaterally. No wheezing or rales.  Abdominal: Soft and non-distended.  There is no tenderness.  No rebound, guarding, or rigidity.   Musculoskeletal: Moves all extremities. No obvious deformities.  "  Right Hand: No obvious deformity. Lesion to dorsal aspect of MCP of 2nd digit of R hand. Swelling to R hand with changes consistent with cellulitis. Patient endorses difficulty extending R 2nd digit secondary to pain. Mild tenderness to palpation over flexor sheath along R 2nd digit, finger held in slight flexion. Radial and ulnar pulses are 2+. Normal capillary refill.  Distal sensation is intact.  Skin: Warm and dry.  Neurological:  Alert, awake, and appropriate.  Normal speech.  No acute focal neurological deficits are appreciated.  Psychiatric: Normal affect. Good eye contact. Appropriate in content.     ED Course   Procedures  ED Vital Signs:  Vitals:    04/28/19 2324 04/29/19 0144 04/29/19 0332 04/29/19 0632   BP: (!) 146/97  130/74 (!) 107/58   Pulse: 63  (!) 56 (!) 51   Resp: 18      Temp: 98.4 °F (36.9 °C) 98.3 °F (36.8 °C)     TempSrc: Oral Oral     SpO2: 100%  98% 98%   Weight: 56.4 kg (124 lb 5.4 oz)      Height: 5' 6" (1.676 m)       04/29/19 0718 04/29/19 0744 04/29/19 1234 04/29/19 1629   BP: 109/78 109/76 114/83 (!) 112/57   Pulse: (!) 56 (!) 55 (!) 52 (!) 54   Resp: 20 16 18 18   Temp: 98 °F (36.7 °C) 97.9 °F (36.6 °C) 98.4 °F (36.9 °C) 98 °F (36.7 °C)   TempSrc: Oral Oral Oral Oral   SpO2: 100% 99% 100% 100%   Weight:       Height:        04/29/19 1934   BP: 118/66   Pulse: (!) 52   Resp: 16   Temp: 98.2 °F (36.8 °C)   TempSrc: Oral   SpO2: 100%   Weight:    Height:        Abnormal Lab Results:  Labs Reviewed   CBC W/ AUTO DIFFERENTIAL - Abnormal; Notable for the following components:       Result Value    Mean Corpuscular Hemoglobin 31.6 (*)     All other components within normal limits   COMPREHENSIVE METABOLIC PANEL - Abnormal; Notable for the following components:    CO2 20 (*)     Albumin 3.4 (*)     Alkaline Phosphatase 47 (*)     All other components within normal limits   PREGNANCY TEST, URINE RAPID   URINALYSIS   DRUG SCREEN PANEL, URINE EMERGENCY   SEDIMENTATION RATE   C-REACTIVE " PROTEIN   C-REACTIVE PROTEIN   SEDIMENTATION RATE        All Lab Results:  Results for orders placed or performed during the hospital encounter of 04/29/19   CBC auto differential   Result Value Ref Range    WBC 11.08 3.90 - 12.70 K/uL    RBC 4.43 4.00 - 5.40 M/uL    Hemoglobin 14.0 12.0 - 16.0 g/dL    Hematocrit 40.6 37.0 - 48.5 %    Mean Corpuscular Volume 92 82 - 98 fL    Mean Corpuscular Hemoglobin 31.6 (H) 27.0 - 31.0 pg    Mean Corpuscular Hemoglobin Conc 34.5 32.0 - 36.0 g/dL    RDW 13.5 11.5 - 14.5 %    Platelets 196 150 - 350 K/uL    MPV 11.3 9.2 - 12.9 fL    Gran # (ANC) 6.4 1.8 - 7.7 K/uL    Lymph # 3.7 1.0 - 4.8 K/uL    Mono # 0.8 0.3 - 1.0 K/uL    Eos # 0.1 0.0 - 0.5 K/uL    Baso # 0.02 0.00 - 0.20 K/uL    Gran% 57.7 38.0 - 73.0 %    Lymph% 33.6 18.0 - 48.0 %    Mono% 7.2 4.0 - 15.0 %    Eosinophil% 1.3 0.0 - 8.0 %    Basophil% 0.2 0.0 - 1.9 %    Differential Method Automated    Comprehensive metabolic panel   Result Value Ref Range    Sodium 138 136 - 145 mmol/L    Potassium 3.6 3.5 - 5.1 mmol/L    Chloride 108 95 - 110 mmol/L    CO2 20 (L) 23 - 29 mmol/L    Glucose 107 70 - 110 mg/dL    BUN, Bld 7 6 - 20 mg/dL    Creatinine 0.7 0.5 - 1.4 mg/dL    Calcium 9.0 8.7 - 10.5 mg/dL    Total Protein 6.4 6.0 - 8.4 g/dL    Albumin 3.4 (L) 3.5 - 5.2 g/dL    Total Bilirubin 0.4 0.1 - 1.0 mg/dL    Alkaline Phosphatase 47 (L) 55 - 135 U/L    AST 10 10 - 40 U/L    ALT 12 10 - 44 U/L    Anion Gap 10 8 - 16 mmol/L    eGFR if African American >60 >60 mL/min/1.73 m^2    eGFR if non African American >60 >60 mL/min/1.73 m^2   Pregnancy, urine rapid   Result Value Ref Range    Preg Test, Ur Negative    Urinalysis   Result Value Ref Range    Specimen UA Urine, Clean Catch     Color, UA Yellow Yellow, Straw, Jinny    Appearance, UA Clear Clear    pH, UA 8.0 5.0 - 8.0    Specific Gravity, UA 1.015 1.005 - 1.030    Protein, UA Negative Negative    Glucose, UA Negative Negative    Ketones, UA Negative Negative    Bilirubin (UA)  Negative Negative    Occult Blood UA Negative Negative    Nitrite, UA Negative Negative    Urobilinogen, UA Negative <2.0 EU/dL    Leukocytes, UA Negative Negative   Drug screen panel, emergency   Result Value Ref Range    Benzodiazepines Negative     Methadone metabolites Negative     Cocaine (Metab.) Negative     Opiate Scrn, Ur Presumptive Positive     Barbiturate Screen, Ur Negative     Amphetamine Screen, Ur Negative     THC Presumptive Positive     Phencyclidine Negative     Creatinine, Random Ur 89.8 15.0 - 325.0 mg/dL    Toxicology Information SEE COMMENT    C-reactive protein   Result Value Ref Range    CRP 5.2 0.0 - 8.2 mg/L   Sedimentation rate   Result Value Ref Range    Sed Rate 3 0 - 20 mm/Hr       Imaging Results:  Imaging Results          X-Ray Hand 3 View Right (Edited Result - FINAL)  Result time 04/29/19 13:38:28   Procedure changed from X-Ray Hand 3 view Left     Addendum 1 of 1 by Isak Sumner MD (04/29/19 13:38:28)      Subtle calcifications seen along the radial aspect at the second metacarpal head which is better characterized on CT same date of service.      Electronically signed by: Isak Sumner MD  Date:    04/29/2019  Time:    13:38               Final result by Isak Sumner MD (04/29/19 08:07:08)                 Impression:      No acute fracture or dislocation.  Limited study.  Follow-up can be obtained as clinically warranted.      Electronically signed by: Isak Sumner MD  Date:    04/29/2019  Time:    08:07             Narrative:    EXAMINATION:  XR HAND COMPLETE 3 VIEW RIGHT    CLINICAL HISTORY:  XR HAND COMPLETE 3 VIEW RIGHT    COMPARISON:  None    FINDINGS:  Three views of the right hand were obtained.    No evidence of acute fracture or dislocation.  Bony mineralization is normal.  Mild soft tissue swelling of the 2nd digit.    Limited evaluation with contraction of the 2nd digit.                                 Ordered, reviewed, and independently interpreted by the ED  provider.  Study: X-ray Hand  Findings: NAF. No foreign body          The Emergency Provider reviewed the vital signs and test results, which are outlined above.     ED Discussion       Will treat for presumptive tenosynovitis. Will x-ray, give IV abx, and admit to hospital medicine for ortho to evaluate patient in the morning.     2:38 AM: Discussed pt's case with Dr. Dominguez ([facility]) who recommends consult ortho.    2:45 AM: Discussed pt's case with Dr. Oden (Ortho) who recommends admit to hospital medicine and he will see patient in the morning.    2:50 AM: Discussed case with Dr. Dominguez ([facility]) who agrees with current care and management of pt and accepts admission.   Admitting Service: Hospital medicine   Admitting Physician: Dr. Dominguez  Admit to: Med/surg    2:59 AM: Re-evaluated pt. I have discussed test results, shared treatment plan, and the need for admission with patient and family at bedside. Pt and family express understanding at this time and agree with all information. All questions answered. Pt and family have no further questions or concerns at this time. Pt is ready for admit.      ED Medication(s):  Medications   sodium chloride 0.9% flush 5 mL (has no administration in time range)   glucose chewable tablet 16 g (has no administration in time range)   glucose chewable tablet 24 g (has no administration in time range)   dextrose 50% injection 12.5 g (has no administration in time range)   dextrose 50% injection 25 g (has no administration in time range)   glucagon (human recombinant) injection 1 mg (has no administration in time range)   acetaminophen tablet 650 mg (has no administration in time range)   ketorolac tablet 10 mg (10 mg Oral Given 4/29/19 1619)   ondansetron injection 4 mg (4 mg Intravenous Given 4/29/19 0806)   promethazine (PHENERGAN) 6.25 mg in dextrose 5 % 50 mL IVPB (has no administration in time range)   diphenhydrAMINE injection 25 mg (25 mg Intravenous Given  4/29/19 1949)   vancomycin 750 mg in dextrose 5 % 250 mL IVPB (ready to mix system) (750 mg Intravenous Trough Due 30 minutes Before Dose 4/30/19 1400)   piperacillin-tazobactam 4.5 g in dextrose 5 % 100 mL IVPB (ready to mix system) (4.5 g Intravenous New Bag 4/29/19 1934)   HYDROcodone-acetaminophen  mg per tablet 1 tablet (1 tablet Oral Given 4/29/19 1837)   nicotine 14 mg/24 hr 1 patch (1 patch Transdermal Patch Applied 4/29/19 1949)   sertraline tablet 25 mg (25 mg Oral Given 4/29/19 2048)   cefTRIAXone (ROCEPHIN) 1 g in dextrose 5 % 50 mL IVPB (0 g Intravenous Stopped 4/29/19 0221)   vancomycin 1.75 g in 5 % dextrose 500 mL IVPB (0 mg Intravenous Stopped 4/29/19 0731)   hydromorphone (PF) injection 1 mg (1 mg Intravenous Given 4/29/19 0150)   iohexol (OMNIPAQUE 350) injection 75 mL (75 mLs Intravenous Given 4/29/19 1216)     Current Discharge Medication List                   Medical Decision Making     Medical Decision Making:   Clinical Tests:   Lab Tests: Ordered and Reviewed  Radiological Study: Ordered and Reviewed             Scribe Attestation:   Scribe #1: I performed the above scribed service and the documentation accurately describes the services I performed. I attest to the accuracy of the note.     Attending:   Physician Attestation Statement for Scribe #1: I, Teo Garcia MD, personally performed the services described in this documentation, as scribed by Kate Phelps, in my presence, and it is both accurate and complete.           Clinical Impression       ICD-10-CM ICD-9-CM   1. Infection of right hand L08.9 686.9       Disposition:   Disposition: Admitted  Condition: Fair         Teo Garcia MD  04/29/19 2056

## 2019-04-29 NOTE — HPI
38F presents with right hand wound check today.  She had incurred right hand cut from washing dishes on 4/25, had difficult flexing right index finger due to pain and swelling around 2nd MCP, presented to ER at the time and wound managed with silver nitrate and topical antibacterial ointment.   She presented again yesterday with same symptoms.  Was given PO clindamycin and PO norco from ER and sent home from ER.  She presents again today with reports of increase in swelling and erythema.  Patient denies any fever, chills, drainage from wound, N/V, extremity weakness/numbness/tingling, and all other sxs at this time.   XR show no fracture or cellulitis.  Orthopedic surgery consulted in ER.  Patient given IV rocephin and vancomycin and 1mg IV dilaudid in ER.  Hospital medicine called for admission.

## 2019-04-30 VITALS
SYSTOLIC BLOOD PRESSURE: 137 MMHG | DIASTOLIC BLOOD PRESSURE: 86 MMHG | HEART RATE: 60 BPM | HEIGHT: 66 IN | TEMPERATURE: 98 F | RESPIRATION RATE: 16 BRPM | BODY MASS INDEX: 19.98 KG/M2 | WEIGHT: 124.31 LBS | OXYGEN SATURATION: 100 %

## 2019-04-30 LAB
ALBUMIN SERPL BCP-MCNC: 3.1 G/DL (ref 3.5–5.2)
ANION GAP SERPL CALC-SCNC: 9 MMOL/L (ref 8–16)
BASOPHILS # BLD AUTO: 0.03 K/UL (ref 0–0.2)
BASOPHILS NFR BLD: 0.3 % (ref 0–1.9)
BUN SERPL-MCNC: 8 MG/DL (ref 6–20)
CALCIUM SERPL-MCNC: 8.6 MG/DL (ref 8.7–10.5)
CHLORIDE SERPL-SCNC: 106 MMOL/L (ref 95–110)
CO2 SERPL-SCNC: 21 MMOL/L (ref 23–29)
CREAT SERPL-MCNC: 0.8 MG/DL (ref 0.5–1.4)
DIFFERENTIAL METHOD: NORMAL
EOSINOPHIL # BLD AUTO: 0.2 K/UL (ref 0–0.5)
EOSINOPHIL NFR BLD: 1.9 % (ref 0–8)
ERYTHROCYTE [DISTWIDTH] IN BLOOD BY AUTOMATED COUNT: 13.4 % (ref 11.5–14.5)
EST. GFR  (AFRICAN AMERICAN): >60 ML/MIN/1.73 M^2
EST. GFR  (NON AFRICAN AMERICAN): >60 ML/MIN/1.73 M^2
GLUCOSE SERPL-MCNC: 83 MG/DL (ref 70–110)
HCT VFR BLD AUTO: 42.5 % (ref 37–48.5)
HGB BLD-MCNC: 14.1 G/DL (ref 12–16)
LYMPHOCYTES # BLD AUTO: 3.2 K/UL (ref 1–4.8)
LYMPHOCYTES NFR BLD: 30.6 % (ref 18–48)
MAGNESIUM SERPL-MCNC: 1.9 MG/DL (ref 1.6–2.6)
MCH RBC QN AUTO: 30.4 PG (ref 27–31)
MCHC RBC AUTO-ENTMCNC: 33.2 G/DL (ref 32–36)
MCV RBC AUTO: 92 FL (ref 82–98)
MONOCYTES # BLD AUTO: 0.9 K/UL (ref 0.3–1)
MONOCYTES NFR BLD: 8.4 % (ref 4–15)
NEUTROPHILS # BLD AUTO: 6.1 K/UL (ref 1.8–7.7)
NEUTROPHILS NFR BLD: 58.8 % (ref 38–73)
PHOSPHATE SERPL-MCNC: 4.1 MG/DL (ref 2.7–4.5)
PHOSPHATE SERPL-MCNC: 4.1 MG/DL (ref 2.7–4.5)
PLATELET # BLD AUTO: 204 K/UL (ref 150–350)
PMV BLD AUTO: 11.6 FL (ref 9.2–12.9)
POTASSIUM SERPL-SCNC: 4.2 MMOL/L (ref 3.5–5.1)
RBC # BLD AUTO: 4.64 M/UL (ref 4–5.4)
SODIUM SERPL-SCNC: 136 MMOL/L (ref 136–145)
WBC # BLD AUTO: 10.45 K/UL (ref 3.9–12.7)

## 2019-04-30 PROCEDURE — 63600175 PHARM REV CODE 636 W HCPCS: Performed by: NURSE PRACTITIONER

## 2019-04-30 PROCEDURE — 80069 RENAL FUNCTION PANEL: CPT

## 2019-04-30 PROCEDURE — 25000003 PHARM REV CODE 250: Performed by: NURSE PRACTITIONER

## 2019-04-30 PROCEDURE — 25000003 PHARM REV CODE 250: Performed by: HOSPITALIST

## 2019-04-30 PROCEDURE — 85025 COMPLETE CBC W/AUTO DIFF WBC: CPT

## 2019-04-30 PROCEDURE — 63600175 PHARM REV CODE 636 W HCPCS: Performed by: EMERGENCY MEDICINE

## 2019-04-30 PROCEDURE — 83735 ASSAY OF MAGNESIUM: CPT

## 2019-04-30 PROCEDURE — S4991 NICOTINE PATCH NONLEGEND: HCPCS | Performed by: NURSE PRACTITIONER

## 2019-04-30 PROCEDURE — 25000003 PHARM REV CODE 250: Performed by: EMERGENCY MEDICINE

## 2019-04-30 PROCEDURE — 36415 COLL VENOUS BLD VENIPUNCTURE: CPT

## 2019-04-30 RX ORDER — CLINDAMYCIN HYDROCHLORIDE 300 MG/1
300 CAPSULE ORAL EVERY 6 HOURS
Qty: 24 CAPSULE | Refills: 0 | Status: SHIPPED | OUTPATIENT
Start: 2019-04-30 | End: 2019-05-06

## 2019-04-30 RX ORDER — HYDROCODONE BITARTRATE AND ACETAMINOPHEN 10; 325 MG/1; MG/1
1 TABLET ORAL EVERY 8 HOURS PRN
Qty: 8 TABLET | Refills: 0 | Status: SHIPPED | OUTPATIENT
Start: 2019-04-30 | End: 2024-01-29

## 2019-04-30 RX ADMIN — VANCOMYCIN HYDROCHLORIDE 750 MG: 750 INJECTION, POWDER, LYOPHILIZED, FOR SOLUTION INTRAVENOUS at 01:04

## 2019-04-30 RX ADMIN — PIPERACILLIN AND TAZOBACTAM 4.5 G: 4; .5 INJECTION, POWDER, LYOPHILIZED, FOR SOLUTION INTRAVENOUS; PARENTERAL at 04:04

## 2019-04-30 RX ADMIN — KETOROLAC TROMETHAMINE 10 MG: 10 TABLET, FILM COATED ORAL at 04:04

## 2019-04-30 RX ADMIN — Medication 1 PATCH: at 09:04

## 2019-04-30 RX ADMIN — DIPHENHYDRAMINE HYDROCHLORIDE 25 MG: 25 CAPSULE ORAL at 04:04

## 2019-04-30 RX ADMIN — HYDROCODONE BITARTRATE AND ACETAMINOPHEN 1 TABLET: 10; 325 TABLET ORAL at 02:04

## 2019-04-30 RX ADMIN — ACETAMINOPHEN 650 MG: 325 TABLET ORAL at 09:04

## 2019-04-30 RX ADMIN — KETOROLAC TROMETHAMINE 10 MG: 10 TABLET, FILM COATED ORAL at 12:04

## 2019-04-30 NOTE — PLAN OF CARE
Problem: Adult Inpatient Plan of Care  Goal: Plan of Care Review  Outcome: Ongoing (interventions implemented as appropriate)  Pt remained free of injury during shift, stable condition, pain mildly controlled with PRN and breakthrough medication and sleeping between care, dressing to R hand CDI, no acute distress, receiving antibiotics, and will continue to monitor. 24hr chart review performed.

## 2019-04-30 NOTE — PT/OT/SLP EVAL
Occupational Therapy   Evaluation    Name: Manuela Dia  MRN: [id number]  Admitting Diagnosis:  Infection of right hand      Recommendations:     Discharge Recommendations: outpatient OT  Discharge Equipment Recommendations:  none  Barriers to discharge:  None    Assessment:     Manuela Dia is a 38 y.o. female with a medical diagnosis of Infection of right hand.  She presents with SELF CARE DEBILITY. Performance deficits affecting function: impaired self care skills, decreased upper extremity function, pain, decreased ROM, impaired fine motor, impaired coordination, impaired skin, edema.      Rehab Prognosis: Good; patient would benefit from acute skilled OT services to address these deficits and reach maximum level of function.       Plan:     Patient to be seen   to address the above listed problems via self-care/home management, therapeutic activities, therapeutic exercises  · Plan of Care Expires: 05/06/19  · Plan of Care Reviewed with: patient    Subjective     Chief Complaint:  PT C/O (R) HAND 2ND DIGIT PAIN, EDEMA, AND LIMITED FX USE OF (R) HAND.  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    Occupational Profile:  Living Environment: PT LIVES WITH ARABELLA AND 4 CHILDREN (15, 13, 10 & 4) IN 1-STORY HOME WITH THRESHHOLD AT ENTRANCE.  Previous level of function: PT WAS (I) WITH ADL'S, T/F'S AND IADL'S.  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  Equipment Used at Home:  none(TUB-SHOWER COMBO)  Assistance upon Discharge: ARABELLA    Pain/Comfort:  · Pain Rating 1: 7/10  · Location - Side 1: Right  · Location - Orientation 1: upper  · Location 1: second finger  · Pain Addressed 1: Distraction    Patients cultural, spiritual, Latter-day conflicts given the current situation: no    Objective:     Communicated with: NURSE prior to session.  Patient found supine with peripheral IV upon OT entry  to room.    General Precautions: Standard, ((R) HAND 2ND DIGIT DRESSING FOR X5 DAYS THEN CLEANSE WITH LIQUID SOAP AND WATER, PAT DRY, APPLY BACTOBAN OINTMENT AND BANDAID.)   Orthopedic Precautions:N/A   Braces: N/A     Occupational Performance:    Bed Mobility:    · Patient completed Rolling/Turning to Left with  stand by assistance  · Patient completed Rolling/Turning to Right with stand by assistance  · Patient completed Scooting/Bridging with stand by assistance  · Patient completed Supine to Sit with stand by assistance  · Patient completed Sit to Supine with stand by assistance    Functional Mobility/Transfers:  · Patient completed Sit <> Stand Transfer with contact guard assistance  with  no assistive device   · Patient completed Bed <> Chair Transfer using Stand Pivot technique with contact guard assistance with hand-held assist  · Patient completed Toilet Transfer Stand Pivot technique with minimum assistance with  hand-held assist  · Functional Mobility: HHA BED TO/FROM RESTROOM.    Activities of Daily Living:  · Feeding:  stand by assistance      · Grooming: minimum assistance and (A) TO MAKE A PONY TAIL      · Upper Body Dressing: minimum assistance DUE TO DECREASED (R) HAND GROSS/PINCER GRASP  · Lower Body Dressing: minimum assistance DUE TO DECREASED (R) HAND GROSS AND PINCER GRASP  · Toileting: minimum assistance DUE TO LIMITED USE OF (R) HAND    Cognitive/Visual Perceptual:  Cognitive/Psychosocial Skills:     -       Oriented to: Person, Place, Time and Situation   -       Follows Commands/attention:Follows multistep  commands  -       Communication: clear/fluent  -       Memory: No Deficits noted  -       Safety awareness/insight to disability: intact   -       Mood/Affect/Coping skills/emotional control: Appropriate to situation  Visual/Perceptual:      -Intact        Physical Exam:  Edema:  Moderate (R) HAND 2ND DIGIT  Dominant hand:    -       RIGHT  Upper Extremity Range of Motion:     -        Right Upper Extremity: WFL except DECREASED (R) HAND 2ND DIGIT AROM  -       Left Upper Extremity: WFL  Upper Extremity Strength:    -       Right Upper Extremity: WFL except DECREASED (R) HAND 2ND DIGIT STRENGTH WITH DECREASED AROM  -       Left Upper Extremity: WFL   Strength:    -       Right Upper Extremity: WFL except DECREASED (R) HAND 2ND DIGIT FLEXION  -       Left Upper Extremity: WFL  Fine Motor Coordination:    -       Impaired  DUE TO DECREASED (R) HAND 2ND DIGIT AROM  Gross motor coordination:   WFL EXCEPT FOR DECREASED (R) HAND GROSS GRASP/RELEASE    AMPA 6 Click ADL:  AMPAC Total Score: 18    Treatment & Education:  EVAL COMPLETED TODAY.  HEP REVIEWED VERBALLY, WITH DEMO AND WITH PT'S HANDS-ON PRACTICE FOR EDEMA MANIPULATION  THEN GENTLE SROM TO (R) HAND 2ND DIGIT PIP, DIP AND MCP FOLLOWED BY GENTLE GRASP/RELEASE OF SOCK BALL FOLLOWED BY ELEVATION OF (R) HAND ON PILLOW ABOVE HEART LEVEL.  PT WAS ADVISED TO ASK NURSE FOR COLD PACK TO HOLD IN (R) HAND PALMAR REGION FOR EDEMA CONTROL.  Education:      Patient left supine with all lines intact, call button in reach and NURSE present    GOALS:   Multidisciplinary Problems     Occupational Therapy Goals        Problem: Occupational Therapy Goal    Goal Priority Disciplines Outcome Interventions   Occupational Therapy Goal     OT, PT/OT Ongoing (interventions implemented as appropriate)    Description:  1.  PT WILL STATE (R) HAND DIGIT 2ND DIGIT WOUND CARE PER MD (I)'LY ONCE DRESSING REMOVED AFTER X5 DAYS.  2.  PT WILL BE (I) WITH SROM HEP FOR (R) HAND 2ND DIGIT PIP, DIP, AND MCP TO INCREASE AROM TO ALLOW PT TO PULL UP PANTS AND COMB HAIR.  3.  PT WILL BE (I) TO PERFORM (R) GROSS GRASP/RELEASE ON SOCK BALL AS TOLERATED WITH GOAL OF INCREASED FX USE OF (R) HAND AS WELL AS EDEMA CONTROL.  4.  PT WILL BE (I) WITH (R) HAND EDEMA MANAGEMENT VIA ELEVATION AND COLD PACK.                      History:     Past Medical History:   Diagnosis Date    Anxiety      Bipolar 1 disorder     Chronic back pain     Depression     Spine degeneration        Past Surgical History:   Procedure Laterality Date    DILATION AND CURETTAGE OF UTERUS      HYSTERECTOMY         Time Tracking:     OT Date of Treatment: 04/29/19  OT Start Time: 1437  OT Stop Time: 1509  OT Total Time (min): 32 min    Billable Minutes:Evaluation 10  Therapeutic Activity 22    Stella Kemp OT  4/29/2019

## 2019-04-30 NOTE — DISCHARGE SUMMARY
[facility] Hospital Medicine  Discharge Summary      Patient Name: Manuela Dia  MRN: [id number]  Admission Date: 4/29/2019  Hospital Length of Stay: 1 days  Discharge Date and Time:  04/30/2019 2:11 PM  Attending Physician: TIFFANY Mota MD  Discharging Provider: Ann Marie Bustamante NP  Primary Care Provider: Primary Doctor No      HPI:   38F presents with right hand wound check today.  She had incurred right hand cut from washing dishes on 4/25, had difficult flexing right index finger due to pain and swelling around 2nd MCP, presented to ER at the time and wound managed with silver nitrate and topical antibacterial ointment.   She presented again yesterday with same symptoms.  Was given PO clindamycin and PO norco from ER and sent home from ER.  She presents again today with reports of increase in swelling and erythema.  Patient denies any fever, chills, drainage from wound, N/V, extremity weakness/numbness/tingling, and all other sxs at this time.   XR show no fracture or cellulitis.  Orthopedic surgery consulted in ER.  Patient given IV rocephin and vancomycin and 1mg IV dilaudid in ER.  Hospital medicine called for admission.           * No surgery found *      Hospital Course:   Patient is admitted for hand cellulitis at the site of the right MCP joint.  She cut her right hand 4 days earlier when washing dishes.  She was prescribed clindamycin 2 days earlier which she had taken. The area worsened therefore, she presented for evaluation.   Vancomycin and Zosyn in progress.  Orthopedics evaluated the patient and recommended treating cellulitis with no need for surgical intervention.  CT of hand was negative for osteo, abscess and tenosynovitis.  Wound care saw the patient and applied dressing to assist with debriding area that silver nitrate had cauterized area of bleeding.  OT eval and treat ordered an outpatient OT was prescribed at time of discharge.  The next day, swelling to the right index  finger had decreased and there was improved range of motion.  Erythema had not increased.  There was some swelling to the hand that was present.  Patient was afebrile and labs were stable.  She was seen and examined and determined to be safe and stable for discharge. She was prescribed another 6 days of clindamycin and advised to complete her original prescription of clindamycin.  Also, patient was arranged outpatient follow-up appointment with [facility] clinic.  Patient was advised to return to the emergency room with worsened redness, worsened pain and swelling.     Consults:   Consults (From admission, onward)        Status Ordering Provider     Pharmacy to dose Vancomycin consult  Once     Provider:  (Not yet assigned)    Acknowledged DEJAH BEARDEN            Final Active Diagnoses:    Diagnosis Date Noted POA    PRINCIPAL PROBLEM:  Infection of right hand [L08.9] 04/29/2019 Yes      Problems Resolved During this Admission:       Discharged Condition: stable    Disposition: Home or Self Care    Follow Up:  Follow-up Information     [facility] On 5/7/2019.    Why:  Follow Up Appointment scheduled for 8:45am  Contact information:  Primary Care  [address] [place] [address] [id number] [phone]               Patient Instructions:      Referral to Outpatient Occupational therapy   Referral Priority: Routine Referral Type: Occupational Therapy   Referral Reason: Specialty Services Required   Requested Specialty: Occupational Therapy   Number of Visits Requested: 1     Notify your health care provider if you experience any of the following:  temperature >100.4     Notify your health care provider if you experience any of the following:  severe uncontrolled pain     Notify your health care provider if you experience any of the following:  redness, tenderness, or signs of infection (pain, swelling, redness, odor or green/yellow discharge around incision site)     Activity as tolerated        Significant Diagnostic Studies: Labs:   CMP   Recent Labs   Lab 04/29/19  0135 04/30/19  0453    136   K 3.6 4.2    106   CO2 20* 21*    83   BUN 7 8   CREATININE 0.7 0.8   CALCIUM 9.0 8.6*   PROT 6.4  --    ALBUMIN 3.4* 3.1*   BILITOT 0.4  --    ALKPHOS 47*  --    AST 10  --    ALT 12  --    ANIONGAP 10 9   ESTGFRAFRICA >60 >60   EGFRNONAA >60 >60    and CBC   Recent Labs   Lab 04/29/19 0135 04/30/19  0454   WBC 11.08 10.45   HGB 14.0 14.1   HCT 40.6 42.5    204       Pending Diagnostic Studies:     None         Medications:  Reconciled Home Medications:      Medication List      START taking these medications    HYDROcodone-acetaminophen  mg per tablet  Commonly known as:  NORCO  Take 1 tablet by mouth every 8 (eight) hours as needed (breakthrough pain).  Replaces:  HYDROcodone-acetaminophen 5-325 mg per tablet        CONTINUE taking these medications    clindamycin 300 MG capsule  Commonly known as:  CLEOCIN  Take 1 capsule (300 mg total) by mouth every 6 (six) hours. for 6 days     mupirocin 2 % ointment  Commonly known as:  BACTROBAN  Apply topically 3 (three) times daily.        STOP taking these medications    HYDROcodone-acetaminophen 5-325 mg per tablet  Commonly known as:  NORCO  Replaced by:  HYDROcodone-acetaminophen  mg per tablet            Indwelling Lines/Drains at time of discharge:   Lines/Drains/Airways          None          Time spent on the discharge of patient: > 30 minutes  Patient was seen and examined on the date of discharge and determined to be suitable for discharge.         Ann Marie Bustamante NP  Department of Hospital Medicine  [facility]

## 2019-04-30 NOTE — DISCHARGE INSTRUCTIONS
Patient educated in home care to include leaving dressing in place x5 days, then remove, cleanse with gentle liquid soap and water, pat dry, apply bactoban ointment and bandaid.       Take the Clindamycin you have at home and complete the 6 day prescription provided today upon discharge

## 2019-04-30 NOTE — NURSING
AVS reviewed with patient. Wound care educated, follow up appointment and d/c medication discussed, D/C medication deliverered to patient at bedside. Patient verbalized understanding with teach back. No complaints at this time. No further needs. IV removed per order. Patient tolerated well. To be transported home per family.

## 2019-04-30 NOTE — PLAN OF CARE
Sw faxed referral to [facility] Physical therapy at [phone]. Yohana contacted pt at [phone] to inform her. No further case management needs at this time.

## 2019-04-30 NOTE — PLAN OF CARE
Problem: Occupational Therapy Goal  Goal: Occupational Therapy Goal  1.  PT WILL STATE (R) HAND DIGIT 2ND DIGIT WOUND CARE PER MD (I)'LY ONCE DRESSING REMOVED AFTER X5 DAYS.  2.  PT WILL BE (I) WITH SROM HEP FOR (R) HAND 2ND DIGIT PIP, DIP, AND MCP TO INCREASE AROM TO ALLOW PT TO PULL UP PANTS AND COMB HAIR.  3.  PT WILL BE (I) TO PERFORM (R) GROSS GRASP/RELEASE ON SOCK BALL AS TOLERATED WITH GOAL OF INCREASED FX USE OF (R) HAND AS WELL AS EDEMA CONTROL.  4.  PT WILL BE (I) WITH (R) HAND EDEMA MANAGEMENT VIA ELEVATION AND COLD PACK.    Outcome: Ongoing (interventions implemented as appropriate)  PT WILL PERFORM GOALS SET AT EVAL WITH TO DECREASE EDEMA, INCREASE (R) HAND 2ND DIGIT AROM, AND ACHIEVE FX USE OF (R) HAND.

## 2019-05-01 NOTE — PLAN OF CARE
05/01/19 0914   Final Note   Assessment Type Final Discharge Note   Anticipated Discharge Disposition Home   Right Care Referral Info   Post Acute Recommendation No Care

## 2019-11-11 ENCOUNTER — HOSPITAL ENCOUNTER (EMERGENCY)
Facility: HOSPITAL | Age: 39
Discharge: HOME | End: 2019-11-11
Attending: EMERGENCY MEDICINE
Payer: MEDICAID

## 2019-11-11 VITALS
RESPIRATION RATE: 18 BRPM | DIASTOLIC BLOOD PRESSURE: 76 MMHG | OXYGEN SATURATION: 100 % | HEIGHT: 65 IN | BODY MASS INDEX: 21.66 KG/M2 | TEMPERATURE: 98 F | SYSTOLIC BLOOD PRESSURE: 123 MMHG | WEIGHT: 130 LBS | HEART RATE: 89 BPM

## 2019-11-11 DIAGNOSIS — S93.401A SPRAIN OF RIGHT ANKLE, UNSPECIFIED LIGAMENT, INITIAL ENCOUNTER: Primary | ICD-10-CM

## 2019-11-11 DIAGNOSIS — M25.579 ANKLE PAIN: ICD-10-CM

## 2019-11-11 PROCEDURE — 25000003 PHARM REV CODE 250: Performed by: NURSE PRACTITIONER

## 2019-11-11 PROCEDURE — 99283 EMERGENCY DEPT VISIT LOW MDM: CPT | Mod: 25

## 2019-11-11 RX ORDER — HYDROCODONE BITARTRATE AND ACETAMINOPHEN 5; 325 MG/1; MG/1
1 TABLET ORAL
Status: COMPLETED | OUTPATIENT
Start: 2019-11-11 | End: 2019-11-11

## 2019-11-11 RX ORDER — NAPROXEN 375 MG/1
375 TABLET ORAL 2 TIMES DAILY WITH MEALS
Qty: 20 TABLET | Refills: 0 | Status: SHIPPED | OUTPATIENT
Start: 2019-11-11 | End: 2020-10-18 | Stop reason: SDUPTHER

## 2019-11-11 RX ADMIN — HYDROCODONE BITARTRATE AND ACETAMINOPHEN 1 TABLET: 5; 325 TABLET ORAL at 09:11

## 2019-11-11 NOTE — ED NOTES
Patient identifiers verified and correct for Manuela Dia.    Patient presented to the ED with c/o left ankle pain, patient reports that she  twisted her ankle while holding laundry basket yesterday. No discoloration noted      LOC: The patient is awake, alert and aware of environment with an appropriate affect, the patient is oriented x 3 and speaking appropriately.  APPEARANCE: Patient resting comfortably and in no acute distress, patient is clean and well groomed, patient's clothing is properly fastened.  HEENT: WNL   SKIN: The skin is warm and dry, color consistent with ethnicity, patient has normal skin turgor and moist mucus membranes, skin intact, no breakdown or bruising noted.  MUSCULOSKELETAL: Patient moving all extremities spontaneously. + left ankle pain   RESPIRATORY: Airway is open and patent, respirations are spontaneous, and unlabored. Breath sounds are clear.   CARDIAC: Patient has a normal rate, no periphreal edema noted, capillary refill < 3 seconds.   ABDOMEN: Soft and non tender to palpation. No distention noted.   : Normal urinary patterns. Urine is yellow without foul odor.

## 2019-11-11 NOTE — ED PROVIDER NOTES
Encounter Date: 11/11/2019       History     Chief Complaint   Patient presents with    Ankle Pain     pt c/o pain to Rt ankle after twisting it last night     38 year old female with complaint of right ankle pain X one day.  Pt reports that she twisted her ankle while holding laundry basket yesterday.  Moderate pain.  Worse with walking. NO radiation of pain. Constant aching pain.  Mild relief with rest of ankle.         Review of patient's allergies indicates:   Allergen Reactions    Penicillins      Tolerated zosyn 4/29/2019    Tramadol Nausea And Vomiting and Other (See Comments)     Extreme fatigue, weakness  Extreme fatigue, weakness     Past Medical History:   Diagnosis Date    Anxiety     Bipolar 1 disorder     Chronic back pain     Depression     Spine degeneration      Past Surgical History:   Procedure Laterality Date    DILATION AND CURETTAGE OF UTERUS      HYSTERECTOMY       Family History   Problem Relation Age of Onset    Scleroderma Mother     Pulmonary fibrosis Mother     Diabetes Mother     Hypertension Mother     Hyperlipidemia Mother     Cancer Mother     COPD Mother      Social History     Tobacco Use    Smoking status: Current Every Day Smoker     Packs/day: 1.00     Types: Cigarettes    Smokeless tobacco: Never Used   Substance Use Topics    Alcohol use: No    Drug use: Yes     Types: Marijuana     Review of Systems   Constitutional: Negative for fever.   HENT: Negative for sore throat.    Respiratory: Negative for shortness of breath.    Cardiovascular: Negative for chest pain.   Gastrointestinal: Negative for nausea.   Genitourinary: Negative for dysuria.   Musculoskeletal: Negative for back pain.        Right ankle pain    Skin: Negative for rash.   Neurological: Negative for weakness.   Hematological: Does not bruise/bleed easily.       Physical Exam     Initial Vitals [11/11/19 0825]   BP Pulse Resp Temp SpO2   123/76 89 18 98 °F (36.7 °C) 100 %      MAP       --          Physical Exam    Nursing note and vitals reviewed.  Constitutional: She appears well-developed and well-nourished.   HENT:   Head: Normocephalic and atraumatic.   Eyes: Conjunctivae and EOM are normal. Pupils are equal, round, and reactive to light.   Neck: Normal range of motion. Neck supple.   Cardiovascular: Normal rate, regular rhythm, normal heart sounds and intact distal pulses.   Pulmonary/Chest: Breath sounds normal.   Abdominal: Soft. There is no tenderness. There is no rebound and no guarding.   Musculoskeletal:   Right lateral ankle tenderness and swelling, no foot tenderness, no knee tenderness, 2+ right dorsalis pedis pulse   Neurological: She is alert and oriented to person, place, and time. She has normal strength and normal reflexes.   Skin: Skin is warm and dry.   Psychiatric: She has a normal mood and affect. Her behavior is normal. Thought content normal.         ED Course   Splint Application  Date/Time: 11/11/2019 9:06 AM  Performed by: Puma Singh NP  Authorized by: Darcy Myles MD   Comments: ACE bandage applied to right ankle: alignment good, neurovascular status intact        Labs Reviewed - No data to display       Imaging Results          X-Ray Ankle Complete Right (Final result)  Result time 11/11/19 09:03:46    Final result by Reta Zelaya MD (Timothy) (11/11/19 09:03:46)                 Impression:      No fractures.      Electronically signed by: Reta Zelaya MD  Date:    11/11/2019  Time:    09:03             Narrative:    EXAMINATION:  XR ANKLE COMPLETE 3 VIEW RIGHT    CLINICAL HISTORY:  Pain in unspecified ankle and joints of unspecified foot    TECHNIQUE:  Standard radiography performed.    COMPARISON:  None    FINDINGS:  Bone density and architecture are normal.  No acute findings.  Lateral soft tissue swelling.                                                                 Clinical Impression:       ICD-10-CM ICD-9-CM   1. Sprain of right ankle,  unspecified ligament, initial encounter S93.401A 845.00   2. Ankle pain M25.579 719.47                             Puma Singh NP  11/11/19 0908       Puma Singh NP  11/11/19 0908

## 2020-10-18 ENCOUNTER — HOSPITAL ENCOUNTER (EMERGENCY)
Facility: HOSPITAL | Age: 40
Discharge: HOME | End: 2020-10-18
Attending: EMERGENCY MEDICINE
Payer: MEDICAID

## 2020-10-18 VITALS
RESPIRATION RATE: 20 BRPM | TEMPERATURE: 98 F | SYSTOLIC BLOOD PRESSURE: 169 MMHG | BODY MASS INDEX: 19.95 KG/M2 | OXYGEN SATURATION: 97 % | WEIGHT: 124.13 LBS | HEART RATE: 98 BPM | HEIGHT: 66 IN | DIASTOLIC BLOOD PRESSURE: 79 MMHG

## 2020-10-18 DIAGNOSIS — S60.221A CONTUSION OF RIGHT HAND, INITIAL ENCOUNTER: Primary | ICD-10-CM

## 2020-10-18 PROCEDURE — 99283 EMERGENCY DEPT VISIT LOW MDM: CPT | Mod: 25

## 2020-10-18 PROCEDURE — 25000003 PHARM REV CODE 250: Performed by: PHYSICIAN ASSISTANT

## 2020-10-18 RX ORDER — NAPROXEN 500 MG/1
500 TABLET ORAL 2 TIMES DAILY WITH MEALS
Qty: 12 TABLET | Refills: 0 | Status: SHIPPED | OUTPATIENT
Start: 2020-10-18 | End: 2024-01-29

## 2020-10-18 RX ORDER — NAPROXEN 500 MG/1
500 TABLET ORAL
Status: COMPLETED | OUTPATIENT
Start: 2020-10-18 | End: 2020-10-18

## 2020-10-18 RX ADMIN — NAPROXEN 500 MG: 500 TABLET ORAL at 06:10

## 2020-10-19 NOTE — ED PROVIDER NOTES
History      Chief Complaint   Patient presents with    Hand Pain     pt c/o R hand pain that began after wooden garage door fell on hand        Review of patient's allergies indicates:   Allergen Reactions    Penicillins      Tolerated zosyn 4/29/2019    Tramadol Nausea And Vomiting and Other (See Comments)     Extreme fatigue, weakness  Extreme fatigue, weakness  Other reaction(s): Vomiting        HPI   HPI    10/18/2020, 12:06 PM   History obtained from the patient      History of Present Illness: Manuela Dia is a 39 y.o. female patient who presents to the Emergency Department for right hand pain since closing garage door on hand pta. Symptoms are moderate in severity.     No further complaints or concerns at this time.           PCP: Primary Doctor No       Past Medical History:  Past Medical History:   Diagnosis Date    Anxiety     Bipolar 1 disorder     Chronic back pain     Depression     Spine degeneration          Past Surgical History:  Past Surgical History:   Procedure Laterality Date    DILATION AND CURETTAGE OF UTERUS      HYSTERECTOMY             Family History:  Family History   Problem Relation Age of Onset    Scleroderma Mother     Pulmonary fibrosis Mother     Diabetes Mother     Hypertension Mother     Hyperlipidemia Mother     Cancer Mother     COPD Mother            Social History:  Social History     Tobacco Use    Smoking status: Current Every Day Smoker     Packs/day: 1.00     Types: Cigarettes    Smokeless tobacco: Never Used   Substance and Sexual Activity    Alcohol use: No    Drug use: Yes     Types: Marijuana    Sexual activity: Yes     Partners: Male     Birth control/protection: See Surgical Hx       ROS     Review of Systems   Constitutional: Negative for chills and fever.   HENT: Negative for facial swelling and trouble swallowing.    Eyes: Negative for discharge, redness and visual disturbance.   Respiratory: Negative for chest tightness and shortness of  breath.    Cardiovascular: Negative for chest pain and leg swelling.   Gastrointestinal: Negative for diarrhea and vomiting.   Genitourinary: Negative for decreased urine volume and dysuria.   Musculoskeletal: Negative for joint swelling and neck stiffness.   Skin: Negative for rash and wound.   Neurological: Negative for syncope and facial asymmetry.   All other systems reviewed and are negative.      Physical Exam      Initial Vitals [10/18/20 1740]   BP Pulse Resp Temp SpO2   (!) 169/79 98 20 98.4 °F (36.9 °C) 97 %      MAP       --         Physical Exam  Vital signs and nursing notes reviewed.  Constitutional: Patient is in NAD. Awake and alert. Well-developed and well-nourished.  Head: Atraumatic. Normocephalic.  Eyes: PERRL. EOM intact. Conjunctivae nl. No scleral icterus.  ENT: Mucous membranes are moist. Oropharynx is clear.  Neck: Supple. No JVD. No lymphadenopathy.  No meningismus  Cardiovascular: Regular rate and rhythm. No murmurs, rubs, or gallops. Distal pulses are 2+ and symmetric.  Pulmonary/Chest: No respiratory distress. Clear to auscultation bilaterally. No wheezing, rales, or rhonchi.  Abdominal: Soft. Non-distended. No TTP. No rebound, guarding, or rigidity. Good bowel sounds.  Genitourinary: No CVA tenderness  Musculoskeletal: Moves all extremities. Right hand with ttp over metacarpals 1-4.  FROM of fingers.  Normal sensation, and cap refill less than 2, to fingers x 5.   Skin: Warm and dry.  Neurological: Awake and alert. No acute focal neurological deficits are appreciated.  Psychiatric: Normal affect. Good eye contact. Appropriate in content.      ED Course          Splint Application    Date/Time: 10/18/2020 12:08 PM  Performed by: Kiah Hernandez PA-C  Authorized by: Josie Huang,    Location details: right hand  Supplies used: elastic bandage  Post-procedure: The splinted body part was neurovascularly unchanged following the procedure.  Patient tolerance: Patient tolerated the  "procedure well with no immediate complications        ED Vital Signs:  Vitals:    10/18/20 1740   BP: (!) 169/79   Pulse: 98   Resp: 20   Temp: 98.4 °F (36.9 °C)   TempSrc: Oral   SpO2: 97%   Weight: 56.3 kg (124 lb 1.9 oz)   Height: 5' 6" (1.676 m)                 Imaging Results:  Imaging Results          X-Ray Hand 3 View Right (Final result)  Result time 10/18/20 18:23:27    Final result by Nani Moody MD (10/18/20 18:23:27)                 Impression:      No definite acute abnormality.  Recommend follow-up if symptoms persist      Electronically signed by: Fransisco Cardoza  Date:    10/18/2020  Time:    18:23             Narrative:    EXAMINATION:  XR HAND COMPLETE 3 VIEW RIGHT    CLINICAL HISTORY:  hand injury;    TECHNIQUE:  PA, lateral, and oblique views of the right hand were performed.    COMPARISON:  Prior    FINDINGS:  No acute fracture or dislocation.  Normal bone mineral density.  Joint spaces are unremarkable.  Soft tissues within normal limits.                                   The Emergency Provider reviewed the vital signs and test results, which are outlined above.    ED Discussion             Medication(s) given in the ER:  Medications   naproxen tablet 500 mg (500 mg Oral Given 10/18/20 1839)           Follow-up Information     [facility] In 2 days.    Contact information:  [address] [facility] [address] [phone]                          Medication List      CHANGE how you take these medications    naproxen 500 MG tablet  Commonly known as: NAPROSYN  Take 1 tablet (500 mg total) by mouth 2 (two) times daily with meals.  What changed:   · medication strength  · how much to take        ASK your doctor about these medications    HYDROcodone-acetaminophen  mg per tablet  Commonly known as: NORCO  Take 1 tablet by mouth every 8 (eight) hours as needed (breakthrough pain).     mupirocin 2 % ointment  Commonly known as: BACTROBAN  Apply topically 3 (three) times " daily.           Where to Get Your Medications      You can get these medications from any pharmacy    Bring a paper prescription for each of these medications  · naproxen 500 MG tablet             Medical Decision Making        All findings were reviewed with the patient/family in detail.   All remaining questions and concerns were addressed at that time.  Patient/family has been counseled regarding the need for follow-up as well as the indication to return to the emergency room should new or worrisome developments occur.        MDM               Clinical Impression:        ICD-10-CM ICD-9-CM   1. Contusion of right hand, initial encounter  S60.221A 923.20               Kiah Hernandez PA-C  10/19/20 1208

## 2022-05-28 ENCOUNTER — HOSPITAL ENCOUNTER (EMERGENCY)
Facility: HOSPITAL | Age: 42
Discharge: HOME | End: 2022-05-28
Attending: EMERGENCY MEDICINE
Payer: MEDICAID

## 2022-05-28 VITALS
HEIGHT: 66 IN | SYSTOLIC BLOOD PRESSURE: 124 MMHG | TEMPERATURE: 98 F | OXYGEN SATURATION: 99 % | HEART RATE: 66 BPM | WEIGHT: 149.38 LBS | DIASTOLIC BLOOD PRESSURE: 77 MMHG | RESPIRATION RATE: 17 BRPM | BODY MASS INDEX: 24.01 KG/M2

## 2022-05-28 DIAGNOSIS — M54.2 NECK PAIN ON RIGHT SIDE: ICD-10-CM

## 2022-05-28 PROCEDURE — 93005 ELECTROCARDIOGRAM TRACING: CPT

## 2022-05-28 PROCEDURE — 99284 EMERGENCY DEPT VISIT MOD MDM: CPT | Mod: 25

## 2022-05-28 PROCEDURE — 25000003 PHARM REV CODE 250: Performed by: EMERGENCY MEDICINE

## 2022-05-28 PROCEDURE — 93010 EKG 12-LEAD: ICD-10-PCS | Mod: ,,, | Performed by: INTERNAL MEDICINE

## 2022-05-28 PROCEDURE — 93010 ELECTROCARDIOGRAM REPORT: CPT | Mod: ,,, | Performed by: INTERNAL MEDICINE

## 2022-05-28 RX ORDER — NAPROXEN 500 MG/1
500 TABLET ORAL 2 TIMES DAILY PRN
Qty: 20 TABLET | Refills: 0 | Status: SHIPPED | OUTPATIENT
Start: 2022-05-28 | End: 2024-01-29

## 2022-05-28 RX ORDER — HYDROCODONE BITARTRATE AND ACETAMINOPHEN 5; 325 MG/1; MG/1
1 TABLET ORAL EVERY 6 HOURS PRN
Qty: 10 TABLET | Refills: 0 | Status: SHIPPED | OUTPATIENT
Start: 2022-05-28 | End: 2024-01-29

## 2022-05-28 RX ORDER — HYDROCODONE BITARTRATE AND ACETAMINOPHEN 5; 325 MG/1; MG/1
1 TABLET ORAL
Status: COMPLETED | OUTPATIENT
Start: 2022-05-28 | End: 2022-05-28

## 2022-05-28 RX ORDER — NAPROXEN 500 MG/1
500 TABLET ORAL
Status: COMPLETED | OUTPATIENT
Start: 2022-05-28 | End: 2022-05-28

## 2022-05-28 RX ADMIN — HYDROCODONE BITARTRATE AND ACETAMINOPHEN 1 TABLET: 5; 325 TABLET ORAL at 10:05

## 2022-05-28 RX ADMIN — NAPROXEN 500 MG: 500 TABLET ORAL at 10:05

## 2022-05-28 NOTE — Clinical Note
"Manuela Burton" Ifeoma was seen and treated in our emergency department on 5/28/2022.  She may return to work on 05/31/2022.       If you have any questions or concerns, please don't hesitate to call.      Lin BATISTA    "

## 2022-05-29 NOTE — ED PROVIDER NOTES
"SCRIBE #1 NOTE: I, Ventura Azalea, am scribing for, and in the presence of, Isak Roberto MD. I have scribed the entire note.       History     Chief Complaint   Patient presents with    Chest Pain     Pt reports chest pain x3-4 days that radiates into the right side of the neck and shoulder blade down the arm.      Review of patient's allergies indicates:   Allergen Reactions    Penicillins      Tolerated zosyn 4/29/2019    Tramadol Nausea And Vomiting and Other (See Comments)     Extreme fatigue, weakness  Extreme fatigue, weakness  Other reaction(s): Vomiting         History of Present Illness     HPI    5/28/2022, 9:16 PM  History obtained from the patient      History of Present Illness: Manuela Dia is a 41 y.o. female patient with a PMHx of hysterectomy, anxiety, bipolar, and depression who presents to the Emergency Department for evaluation of worsening right sided neck pain which onset gradually 3-4 days ago. Pt states the pain radiates into her proximal right arm and to her right upper back near her right shoulder blade. 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 Denies CP contrary to triage note. She denies any history of an MI. Symptoms are constant and moderate in severity. An exacerbating factor is movement of the neck or right arm. Associated sxs include decreased sleep 2/2 pain. Patient denies any left sided pain, abdominal pain, SOB, n/v/d, fever, HA, weakness, and all other sxs at this time. Prior Tx includes ibuprofen at 15:00 today. No further complaints or concerns at this time.       Arrival mode: Personal vehicle    PCP: Primary Doctor No        Past Medical History:  Past Medical History:   Diagnosis Date    Anxiety     Bipolar 1 disorder     Chronic back pain     Depression     Spine degeneration        Past Surgical History:  Past Surgical History:   Procedure Laterality Date    DILATION AND CURETTAGE OF UTERUS      HYSTERECTOMY           Family History:  Family " History   Problem Relation Age of Onset    Scleroderma Mother     Pulmonary fibrosis Mother     Diabetes Mother     Hypertension Mother     Hyperlipidemia Mother     Cancer Mother     COPD Mother        Social History:  Social History     Tobacco Use    Smoking status: Current Every Day Smoker     Packs/day: 1.00     Types: Cigarettes    Smokeless tobacco: Never Used   Substance and Sexual Activity    Alcohol use: No    Drug use: Yes     Types: Marijuana    Sexual activity: Yes     Partners: Male     Birth control/protection: See Surgical Hx        Review of Systems     Review of Systems   Constitutional: Negative for fever.   HENT: Negative for sore throat.    Respiratory: Negative for shortness of breath.    Cardiovascular: Negative for chest pain, palpitations and leg swelling.   Gastrointestinal: Negative for abdominal pain, diarrhea, nausea and vomiting.   Genitourinary: Negative for dysuria.   Musculoskeletal: Positive for back pain (Near the right shoulder blade) and neck pain (Right sided).        (+) Right proximal arm pain  (-) Any left sided pain   Skin: Negative for rash.   Neurological: Negative for dizziness, syncope, weakness, numbness and headaches.   Hematological: Does not bruise/bleed easily.   All other systems reviewed and are negative.     Physical Exam     Initial Vitals   BP Pulse Resp Temp SpO2   05/28/22 2058 05/28/22 2055 05/28/22 2055 05/28/22 2055 05/28/22 2055   114/76 72 20 98.2 °F (36.8 °C) 98 %      MAP       --                 Physical Exam   Nursing Notes and Vital Signs Reviewed.  Constitutional: Patient is in no acute distress. Well-developed and well-nourished.  Head: Atraumatic. Normocephalic.  Eyes: PERRL. EOM intact. Conjunctivae are not pale. No scleral icterus.  ENT: Mucous membranes are moist. Oropharynx is clear and symmetric.    Neck: Supple. Full ROM. No lymphadenopathy. No midline tenderness.  Cardiovascular: Regular rate. Regular rhythm. No murmurs, rubs,  "or gallops. Distal pulses are 2+ and symmetric.  Pulmonary/Chest: No respiratory distress. Clear to auscultation bilaterally. No wheezing or rales.  Abdominal: Soft and non-distended.  There is no tenderness.  No rebound, guarding, or rigidity.  Genitourinary: No CVA tenderness  Musculoskeletal: Moves all extremities. No obvious deformities. No edema. No calf tenderness. Right trapezius tenderness. No arm swelling or erythema.   Skin: Warm and dry.  Neurological:  Alert, awake, and appropriate.  Normal speech.  No acute focal neurological deficits are appreciated.  Psychiatric: Normal affect. Good eye contact. Appropriate in content.     ED Course   Procedures  ED Vital Signs:  Vitals:    05/28/22 2055 05/28/22 2058 05/28/22 2227 05/28/22 2229   BP:  114/76  124/77   Pulse: 72   66   Resp: 20  19 17   Temp: 98.2 °F (36.8 °C)   98 °F (36.7 °C)   TempSrc: Oral   Oral   SpO2: 98%   99%   Weight: 67.8 kg (149 lb 5.8 oz)      Height: 5' 6" (1.676 m)          Abnormal Lab Results:  Labs Reviewed - No data to display     All Lab Results:  None    Imaging Results:  Imaging Results          X-Ray Chest AP Portable (Final result)  Result time 05/28/22 22:02:20    Final result by Nani Moody MD (05/28/22 22:02:20)                 Impression:      No acute abnormality.      Electronically signed by: Fransisco Cardoza  Date:    05/28/2022  Time:    22:02             Narrative:    EXAMINATION:  XR CHEST AP PORTABLE    CLINICAL HISTORY:  chest pain;    TECHNIQUE:  Single frontal view of the chest was performed.    COMPARISON:  None    FINDINGS:  The lungs are clear, with normal appearance of pulmonary vasculature and no pleural effusion or pneumothorax.    The cardiac silhouette is normal in size. The hilar and mediastinal contours are unremarkable.    Bones are intact.                               X-Ray Cervical Spine AP And Lateral (Final result)  Result time 05/28/22 22:04:46    Final result by Nani Moody MD (05/28/22 " 22:04:46)                 Impression:      No acute abnormality.      Electronically signed by: Fransisco Nani  Date:    05/28/2022  Time:    22:04             Narrative:    EXAMINATION:  XR CERVICAL SPINE AP LATERAL    CLINICAL HISTORY:  XR CERVICAL SPINE AP LATERALCervicalgia    COMPARISON:  None    FINDINGS:  Multiple radiographic views  were obtained.    No evidence of acute fracture or dislocation.  Bony mineralization is normal.  Soft tissues are unremarkable.                                 The EKG was ordered, reviewed, and independently interpreted by the ED provider.  Interpretation time: 20:54  Rate: 77 BPM  Rhythm: normal sinus rhythm  Interpretation: Cannot rule out Anterior infarct, age undetermined. Nonspecific T wave abnormality present. No STEMI.       The Emergency Provider reviewed the vital signs and test results, which are outlined above.     ED Discussion       10:13 PM: Reassessed pt at this time. Discussed with pt all pertinent ED information and results. Discussed pt dx and plan of tx. Gave pt all f/u and return to the ED instructions. All questions and concerns were addressed at this time. Pt expresses understanding of information and instructions, and is comfortable with plan to discharge. Pt is stable for discharge.    I discussed with patient and/or family/caretaker that evaluation in the ED does not suggest any emergent or life threatening medical conditions requiring immediate intervention beyond what was provided in the ED, and I believe patient is safe for discharge.  Regardless, an unremarkable evaluation in the ED does not preclude the development or presence of a serious of life threatening condition. As such, patient was instructed to return immediately for any worsening or change in current symptoms.         Medical Decision Making:   Clinical Tests:   Radiological Study: Ordered and Reviewed  Medical Tests: Ordered and Reviewed           ED Medication(s):  Medications   naproxen  tablet 500 mg (500 mg Oral Given 5/28/22 2228)   HYDROcodone-acetaminophen 5-325 mg per tablet 1 tablet (1 tablet Oral Given 5/28/22 2227)       New Prescriptions    HYDROCODONE-ACETAMINOPHEN (NORCO) 5-325 MG PER TABLET    Take 1 tablet by mouth every 6 (six) hours as needed for Pain.    NAPROXEN (NAPROSYN) 500 MG TABLET    Take 1 tablet (500 mg total) by mouth 2 (two) times daily as needed (pain).        Follow-up Information     [facility] - Emergency Dept..    Specialty: Emergency Medicine  Why: As needed, If symptoms worsen  Contact information:  [address] [facility] [address] [phone]           Follow-up with primary care physician.                           Scribe Attestation:   Scribe #1: I performed the above scribed service and the documentation accurately describes the services I performed. I attest to the accuracy of the note.     Attending:   Physician Attestation Statement for Scribe #1: I, Isak Roberto MD, personally performed the services described in this documentation, as scribed by Ventura Tristan, in my presence, and it is both accurate and complete.           Clinical Impression       ICD-10-CM ICD-9-CM   1. Neck pain on right side  M54.2 723.1       Disposition:   Disposition: Discharged  Condition: Stable         Isak Roberto MD  05/28/22 2987

## 2024-09-03 ENCOUNTER — HOSPITAL ENCOUNTER (OUTPATIENT)
Dept: RADIOLOGY | Facility: HOSPITAL | Age: 44
Discharge: HOME | End: 2024-09-03
Attending: NURSE PRACTITIONER
Payer: MEDICAID

## 2024-09-03 ENCOUNTER — OFFICE VISIT (OUTPATIENT)
Dept: PRIMARY CARE CLINIC | Facility: CLINIC | Age: 44
End: 2024-09-03
Payer: MEDICAID

## 2024-09-03 VITALS
HEART RATE: 69 BPM | WEIGHT: 140.63 LBS | TEMPERATURE: 99 F | SYSTOLIC BLOOD PRESSURE: 98 MMHG | HEIGHT: 66 IN | DIASTOLIC BLOOD PRESSURE: 60 MMHG | BODY MASS INDEX: 22.6 KG/M2 | OXYGEN SATURATION: 98 %

## 2024-09-03 DIAGNOSIS — F17.210 CIGARETTE NICOTINE DEPENDENCE WITHOUT COMPLICATION: ICD-10-CM

## 2024-09-03 DIAGNOSIS — Z13.220 ENCOUNTER FOR LIPID SCREENING FOR CARDIOVASCULAR DISEASE: ICD-10-CM

## 2024-09-03 DIAGNOSIS — Z13.6 ENCOUNTER FOR LIPID SCREENING FOR CARDIOVASCULAR DISEASE: ICD-10-CM

## 2024-09-03 DIAGNOSIS — F41.8 DEPRESSION WITH ANXIETY: ICD-10-CM

## 2024-09-03 DIAGNOSIS — Z00.00 WELLNESS EXAMINATION: ICD-10-CM

## 2024-09-03 DIAGNOSIS — Z13.1 SCREENING FOR DIABETES MELLITUS: ICD-10-CM

## 2024-09-03 DIAGNOSIS — Z11.4 SCREENING FOR HIV (HUMAN IMMUNODEFICIENCY VIRUS): ICD-10-CM

## 2024-09-03 DIAGNOSIS — Z00.00 GENERAL MEDICAL EXAM: Primary | ICD-10-CM

## 2024-09-03 DIAGNOSIS — R05.9 COUGH, UNSPECIFIED TYPE: ICD-10-CM

## 2024-09-03 DIAGNOSIS — Z11.59 ENCOUNTER FOR HEPATITIS C SCREENING TEST FOR LOW RISK PATIENT: ICD-10-CM

## 2024-09-03 PROCEDURE — 99999 PR PBB SHADOW E&M-EST. PATIENT-LVL IV: CPT | Mod: PBBFAC,,, | Performed by: NURSE PRACTITIONER

## 2024-09-03 PROCEDURE — 3008F BODY MASS INDEX DOCD: CPT | Mod: CPTII,,, | Performed by: NURSE PRACTITIONER

## 2024-09-03 PROCEDURE — 1159F MED LIST DOCD IN RCRD: CPT | Mod: CPTII,,, | Performed by: NURSE PRACTITIONER

## 2024-09-03 PROCEDURE — 99214 OFFICE O/P EST MOD 30 MIN: CPT | Mod: PBBFAC,25,PN | Performed by: NURSE PRACTITIONER

## 2024-09-03 PROCEDURE — 71046 X-RAY EXAM CHEST 2 VIEWS: CPT | Mod: TC,PN

## 2024-09-03 PROCEDURE — 3078F DIAST BP <80 MM HG: CPT | Mod: CPTII,,, | Performed by: NURSE PRACTITIONER

## 2024-09-03 PROCEDURE — 1160F RVW MEDS BY RX/DR IN RCRD: CPT | Mod: CPTII,,, | Performed by: NURSE PRACTITIONER

## 2024-09-03 PROCEDURE — 99204 OFFICE O/P NEW MOD 45 MIN: CPT | Mod: S$PBB,,, | Performed by: NURSE PRACTITIONER

## 2024-09-03 PROCEDURE — 3074F SYST BP LT 130 MM HG: CPT | Mod: CPTII,,, | Performed by: NURSE PRACTITIONER

## 2024-09-03 PROCEDURE — 3044F HG A1C LEVEL LT 7.0%: CPT | Mod: CPTII,,, | Performed by: NURSE PRACTITIONER

## 2024-09-03 NOTE — PROGRESS NOTES
Subjective:       Patient ID: Manuela Dia is a 43 y.o. female.    Chief Complaint: Establish Care/Address Care Gaps    History of Present Illness:   Manuela Dia 43 y.o. female presents today to address care gaps and establish care. Denies any other problems or concerns at this time. Patient reports that  Treatment options and alternatives were discussed with the patient. Patient provided opportunity to ask additional questions.  All questions were answered. Voices understanding and acceptance of this advice. Instructed to call back if any further questions or concerns.     Past Medical History:   Diagnosis Date    Anxiety     Bipolar 1 disorder     BRCA negative     Chronic back pain     Depression     Genetic screening 01/29/2024    Jose Luis/DB Negative; Risk Score= 9.5%    Spine degeneration      Family History   Problem Relation Name Age of Onset    Scleroderma Mother      Pulmonary fibrosis Mother      Diabetes Mother      Hypertension Mother      Hyperlipidemia Mother      Cancer Mother      COPD Mother      Breast cancer Paternal Grandmother       Social History     Socioeconomic History    Marital status: Single   Tobacco Use    Smoking status: Every Day     Current packs/day: 1.00     Types: Cigarettes    Smokeless tobacco: Never   Substance and Sexual Activity    Alcohol use: No    Drug use: Yes     Types: Marijuana    Sexual activity: Yes     Partners: Male     Birth control/protection: See Surgical Hx     Social Determinants of Health     Financial Resource Strain: High Risk (9/3/2024)    Overall Financial Resource Strain (CARDIA)     Difficulty of Paying Living Expenses: Very hard   Food Insecurity: Food Insecurity Present (9/3/2024)    Hunger Vital Sign     Worried About Running Out of Food in the Last Year: Often true     Ran Out of Food in the Last Year: Sometimes true   Physical Activity: Sufficiently Active (9/3/2024)    Exercise Vital Sign     Days of Exercise per Week: 5 days     Minutes of  "Exercise per Session: 150+ min   Stress: Stress Concern Present (9/3/2024)    [employer] [place] [employer] - Occupational Stress Questionnaire     Feeling of Stress : Very much   Housing Stability: Unknown (9/3/2024)    Housing Stability Vital Sign     Unable to Pay for Housing in the Last Year: No     No outpatient encounter medications on file as of 9/3/2024.     No facility-administered encounter medications on file as of 9/3/2024.       Review of Systems   Constitutional:  Positive for unexpected weight change. Negative for activity change.   HENT:  Positive for rhinorrhea. Negative for hearing loss and trouble swallowing.    Eyes:  Negative for discharge and visual disturbance.   Respiratory:  Positive for wheezing. Negative for chest tightness.    Cardiovascular:  Positive for palpitations. Negative for chest pain.   Gastrointestinal:  Negative for blood in stool, constipation, diarrhea and vomiting.   Endocrine: Negative for polydipsia and polyuria.   Genitourinary:  Negative for difficulty urinating, dysuria, hematuria and menstrual problem.   Musculoskeletal:  Positive for arthralgias. Negative for joint swelling and neck pain.   Neurological:  Negative for weakness and headaches.   Psychiatric/Behavioral:  Positive for dysphoric mood. Negative for confusion.        Objective:      BP 98/60 (BP Location: Right arm, Patient Position: Sitting)   Pulse 69   Temp 98.8 °F (37.1 °C) (Oral)   Ht 5' 6" (1.676 m)   Wt 63.8 kg (140 lb 9.6 oz)   LMP 07/08/2014   SpO2 98%   BMI 22.69 kg/m²   Physical Exam  Vitals and nursing note reviewed.   Constitutional:       Appearance: She is well-developed.   HENT:      Head: Normocephalic and atraumatic.      Right Ear: External ear normal.      Left Ear: External ear normal.      Nose: Nose normal.   Eyes:      Conjunctiva/sclera: Conjunctivae normal.      Pupils: Pupils are equal, round, and reactive to light.   Cardiovascular:      Rate and Rhythm: Normal " rate and regular rhythm.      Heart sounds: Normal heart sounds. No murmur heard.  Pulmonary:      Effort: Pulmonary effort is normal.      Breath sounds: Normal breath sounds. No wheezing.   Abdominal:      General: Bowel sounds are normal.      Palpations: Abdomen is soft.      Tenderness: There is no abdominal tenderness.   Musculoskeletal:         General: Normal range of motion.      Cervical back: Normal range of motion and neck supple.   Skin:     General: Skin is warm and dry.      Findings: No rash.   Neurological:      Mental Status: She is alert and oriented to person, place, and time.      Deep Tendon Reflexes: Reflexes are normal and symmetric.   Psychiatric:         Behavior: Behavior normal.         Thought Content: Thought content normal.         Judgment: Judgment normal.         Results for orders placed or performed in visit on 04/22/24   TSH   Result Value Ref Range    TSH 1.490 0.400 - 4.000 uIU/mL   Follicle Stimulating Hormone   Result Value Ref Range    Follicle Stimulating Hormone 5.08 See Text mIU/mL   Luteinizing Hormone   Result Value Ref Range    LH 3.3 See Text mIU/mL   Prolactin   Result Value Ref Range    Prolactin 20.7 5.2 - 26.5 ng/mL   DHEA-Sulfate   Result Value Ref Range    DHEA-SO4 225.4 74.8 - 410.2 ug/dL   Estradiol   Result Value Ref Range    Estradiol 251 See Text pg/mL   Testosterone Free, Profile II   Result Value Ref Range    Albumin 4.6 3.9 - 4.9 g/dL    Testosterone 36.8 ng/dL    Sex Hormone Binding 80.1 24.6 - 122.0 nmol/L    Testosterone, Free 3.5 0.5 - 6.0 pg/mL     Assessment:       1. General medical exam    2. Depression with anxiety    3. Cough, unspecified type    4. Wellness examination    5. Screening for HIV (human immunodeficiency virus)    6. Encounter for hepatitis C screening test for low risk patient    7. Screening for diabetes mellitus    8. Encounter for lipid screening for cardiovascular disease    9. Cigarette nicotine dependence without  complication        Plan:   Manuela was seen today for establish care.    Diagnoses and all orders for this visit:    General medical exam  -     Ambulatory referral/consult to Family Practice  -     CBC Auto Differential; Future  -     Comprehensive Metabolic Panel; Future    Depression with anxiety  -     Ambulatory referral/consult to [facility] Behavioral Health (Non-Opioids); Future    Cough, unspecified type  -     X-Ray Chest PA And Lateral; Future    Wellness examination    Screening for HIV (human immunodeficiency virus)  -     HIV 1/2 Ag/Ab (4th Gen); Future    Encounter for hepatitis C screening test for low risk patient  -     Hepatitis C Antibody; Future    Screening for diabetes mellitus  -     Hemoglobin A1C; Future  -     TSH; Future  -     T3, Free; Future  -     T4, Free; Future    Encounter for lipid screening for cardiovascular disease  -     Lipid Panel; Future    Cigarette nicotine dependence without complication              [facility]   [address]  [place] [address]  Office [phone]  Fax [phone]

## 2024-09-23 ENCOUNTER — TELEPHONE (OUTPATIENT)
Dept: PRIMARY CARE CLINIC | Facility: CLINIC | Age: 44
End: 2024-09-23
Payer: MEDICAID

## 2024-09-23 NOTE — TELEPHONE ENCOUNTER
Called to inform pt to follow up with [facility], Pt advised to call and schedule, [phone]. Pt voiced understanding.     ----- Message from Neelam Eng sent at 9/23/2024  7:40 AM CDT -----  Contact: [phone]  Patient called, stated that she was told to f/u with counselor but was not given a name. Would like a call back from NP.

## 2025-08-01 ENCOUNTER — LAB VISIT (OUTPATIENT)
Dept: LAB | Facility: HOSPITAL | Age: 45
End: 2025-08-01
Attending: NURSE PRACTITIONER
Payer: MEDICAID

## 2025-08-01 ENCOUNTER — OFFICE VISIT (OUTPATIENT)
Dept: PRIMARY CARE CLINIC | Facility: CLINIC | Age: 45
End: 2025-08-01
Payer: MEDICAID

## 2025-08-01 VITALS
OXYGEN SATURATION: 99 % | HEIGHT: 66 IN | BODY MASS INDEX: 22.58 KG/M2 | HEART RATE: 73 BPM | TEMPERATURE: 99 F | WEIGHT: 140.5 LBS | DIASTOLIC BLOOD PRESSURE: 78 MMHG | SYSTOLIC BLOOD PRESSURE: 118 MMHG

## 2025-08-01 DIAGNOSIS — M54.42 CHRONIC BILATERAL LOW BACK PAIN WITH BILATERAL SCIATICA: Primary | ICD-10-CM

## 2025-08-01 DIAGNOSIS — M54.2 NECK PAIN: ICD-10-CM

## 2025-08-01 DIAGNOSIS — Z12.31 BREAST CANCER SCREENING BY MAMMOGRAM: ICD-10-CM

## 2025-08-01 DIAGNOSIS — Z13.1 SCREENING FOR DIABETES MELLITUS: ICD-10-CM

## 2025-08-01 DIAGNOSIS — Z13.220 ENCOUNTER FOR LIPID SCREENING FOR CARDIOVASCULAR DISEASE: ICD-10-CM

## 2025-08-01 DIAGNOSIS — Z13.6 ENCOUNTER FOR LIPID SCREENING FOR CARDIOVASCULAR DISEASE: ICD-10-CM

## 2025-08-01 DIAGNOSIS — Z00.00 GENERAL MEDICAL EXAM: ICD-10-CM

## 2025-08-01 DIAGNOSIS — M54.41 CHRONIC BILATERAL LOW BACK PAIN WITH BILATERAL SCIATICA: Primary | ICD-10-CM

## 2025-08-01 DIAGNOSIS — G89.29 CHRONIC BILATERAL LOW BACK PAIN WITH BILATERAL SCIATICA: Primary | ICD-10-CM

## 2025-08-01 DIAGNOSIS — F41.8 DEPRESSION WITH ANXIETY: ICD-10-CM

## 2025-08-01 LAB
ABSOLUTE EOSINOPHIL (OHS): 0.11 K/UL
ABSOLUTE MONOCYTE (OHS): 0.58 K/UL (ref 0.3–1)
ABSOLUTE NEUTROPHIL COUNT (OHS): 5.33 K/UL (ref 1.8–7.7)
ALBUMIN SERPL BCP-MCNC: 3.8 G/DL (ref 3.5–5.2)
ALP SERPL-CCNC: 52 UNIT/L (ref 40–150)
ALT SERPL W/O P-5'-P-CCNC: 10 UNIT/L (ref 0–55)
ANION GAP (OHS): 11 MMOL/L (ref 8–16)
AST SERPL-CCNC: 16 UNIT/L (ref 0–50)
BASOPHILS # BLD AUTO: 0.06 K/UL
BASOPHILS NFR BLD AUTO: 0.6 %
BILIRUB SERPL-MCNC: 0.6 MG/DL (ref 0.1–1)
BUN SERPL-MCNC: 7 MG/DL (ref 6–20)
CALCIUM SERPL-MCNC: 8.9 MG/DL (ref 8.7–10.5)
CHLORIDE SERPL-SCNC: 107 MMOL/L (ref 95–110)
CHOLEST SERPL-MCNC: 186 MG/DL (ref 120–199)
CHOLEST/HDLC SERPL: 4 {RATIO} (ref 2–5)
CO2 SERPL-SCNC: 21 MMOL/L (ref 23–29)
CREAT SERPL-MCNC: 0.6 MG/DL (ref 0.5–1.4)
EAG (OHS): 97 MG/DL (ref 68–131)
ERYTHROCYTE [DISTWIDTH] IN BLOOD BY AUTOMATED COUNT: 13.2 % (ref 11.5–14.5)
GFR SERPLBLD CREATININE-BSD FMLA CKD-EPI: >60 ML/MIN/1.73/M2
GLUCOSE SERPL-MCNC: 77 MG/DL (ref 70–110)
HBA1C MFR BLD: 5 % (ref 4–5.6)
HCT VFR BLD AUTO: 44.3 % (ref 37–48.5)
HDLC SERPL-MCNC: 47 MG/DL (ref 40–75)
HDLC SERPL: 25.3 % (ref 20–50)
HGB BLD-MCNC: 14.2 GM/DL (ref 12–16)
IMM GRANULOCYTES # BLD AUTO: 0.03 K/UL (ref 0–0.04)
IMM GRANULOCYTES NFR BLD AUTO: 0.3 % (ref 0–0.5)
LDLC SERPL CALC-MCNC: 124 MG/DL (ref 63–159)
LYMPHOCYTES # BLD AUTO: 3.15 K/UL (ref 1–4.8)
MCH RBC QN AUTO: 30.1 PG (ref 27–31)
MCHC RBC AUTO-ENTMCNC: 32.1 G/DL (ref 32–36)
MCV RBC AUTO: 94 FL (ref 82–98)
NONHDLC SERPL-MCNC: 139 MG/DL
NUCLEATED RBC (/100WBC) (OHS): 0 /100 WBC
PLATELET # BLD AUTO: 199 K/UL (ref 150–450)
PMV BLD AUTO: 12.6 FL (ref 9.2–12.9)
POTASSIUM SERPL-SCNC: 4.4 MMOL/L (ref 3.5–5.1)
PROT SERPL-MCNC: 6.8 GM/DL (ref 6–8.4)
RBC # BLD AUTO: 4.71 M/UL (ref 4–5.4)
RELATIVE EOSINOPHIL (OHS): 1.2 %
RELATIVE LYMPHOCYTE (OHS): 34 % (ref 18–48)
RELATIVE MONOCYTE (OHS): 6.3 % (ref 4–15)
RELATIVE NEUTROPHIL (OHS): 57.6 % (ref 38–73)
SODIUM SERPL-SCNC: 139 MMOL/L (ref 136–145)
TRIGL SERPL-MCNC: 75 MG/DL (ref 30–150)
TSH SERPL-ACNC: 1.02 UIU/ML (ref 0.4–4)
URATE SERPL-MCNC: 3.3 MG/DL (ref 2.4–5.7)
WBC # BLD AUTO: 9.26 K/UL (ref 3.9–12.7)

## 2025-08-01 PROCEDURE — 1159F MED LIST DOCD IN RCRD: CPT | Mod: CPTII,,, | Performed by: NURSE PRACTITIONER

## 2025-08-01 PROCEDURE — 99214 OFFICE O/P EST MOD 30 MIN: CPT | Mod: PBBFAC,PN | Performed by: NURSE PRACTITIONER

## 2025-08-01 PROCEDURE — 3044F HG A1C LEVEL LT 7.0%: CPT | Mod: CPTII,,, | Performed by: NURSE PRACTITIONER

## 2025-08-01 PROCEDURE — 85025 COMPLETE CBC W/AUTO DIFF WBC: CPT

## 2025-08-01 PROCEDURE — 36415 COLL VENOUS BLD VENIPUNCTURE: CPT | Mod: PN

## 2025-08-01 PROCEDURE — 84550 ASSAY OF BLOOD/URIC ACID: CPT

## 2025-08-01 PROCEDURE — 84443 ASSAY THYROID STIM HORMONE: CPT

## 2025-08-01 PROCEDURE — 83036 HEMOGLOBIN GLYCOSYLATED A1C: CPT

## 2025-08-01 PROCEDURE — 3074F SYST BP LT 130 MM HG: CPT | Mod: CPTII,,, | Performed by: NURSE PRACTITIONER

## 2025-08-01 PROCEDURE — 3078F DIAST BP <80 MM HG: CPT | Mod: CPTII,,, | Performed by: NURSE PRACTITIONER

## 2025-08-01 PROCEDURE — 99214 OFFICE O/P EST MOD 30 MIN: CPT | Mod: S$PBB,,, | Performed by: NURSE PRACTITIONER

## 2025-08-01 PROCEDURE — 80053 COMPREHEN METABOLIC PANEL: CPT

## 2025-08-01 PROCEDURE — 3008F BODY MASS INDEX DOCD: CPT | Mod: CPTII,,, | Performed by: NURSE PRACTITIONER

## 2025-08-01 PROCEDURE — 99999 PR PBB SHADOW E&M-EST. PATIENT-LVL IV: CPT | Mod: PBBFAC,,, | Performed by: NURSE PRACTITIONER

## 2025-08-01 PROCEDURE — 1160F RVW MEDS BY RX/DR IN RCRD: CPT | Mod: CPTII,,, | Performed by: NURSE PRACTITIONER

## 2025-08-01 PROCEDURE — 80061 LIPID PANEL: CPT
